# Patient Record
Sex: FEMALE | Race: BLACK OR AFRICAN AMERICAN | NOT HISPANIC OR LATINO | Employment: OTHER | URBAN - METROPOLITAN AREA
[De-identification: names, ages, dates, MRNs, and addresses within clinical notes are randomized per-mention and may not be internally consistent; named-entity substitution may affect disease eponyms.]

---

## 2021-02-11 ENCOUNTER — TELEPHONE (OUTPATIENT)
Dept: CARDIOLOGY | Facility: CLINIC | Age: 71
End: 2021-02-11

## 2021-03-26 ENCOUNTER — OFFICE VISIT (OUTPATIENT)
Dept: CARDIOLOGY | Facility: CLINIC | Age: 71
End: 2021-03-26
Payer: MEDICARE

## 2021-03-26 VITALS
RESPIRATION RATE: 16 BRPM | DIASTOLIC BLOOD PRESSURE: 72 MMHG | WEIGHT: 239 LBS | BODY MASS INDEX: 33.46 KG/M2 | SYSTOLIC BLOOD PRESSURE: 138 MMHG | HEART RATE: 72 BPM | HEIGHT: 71 IN | OXYGEN SATURATION: 98 %

## 2021-03-26 DIAGNOSIS — I10 ESSENTIAL HYPERTENSION: ICD-10-CM

## 2021-03-26 DIAGNOSIS — E66.01 SEVERE OBESITY: ICD-10-CM

## 2021-03-26 DIAGNOSIS — R00.2 PALPITATIONS: ICD-10-CM

## 2021-03-26 DIAGNOSIS — I48.0 PAROXYSMAL ATRIAL FIBRILLATION: Primary | ICD-10-CM

## 2021-03-26 DIAGNOSIS — R94.39 ABNORMAL STRESS TEST: ICD-10-CM

## 2021-03-26 DIAGNOSIS — I25.10 CAD IN NATIVE ARTERY: ICD-10-CM

## 2021-03-26 DIAGNOSIS — E78.2 MIXED HYPERLIPIDEMIA: ICD-10-CM

## 2021-03-26 DIAGNOSIS — E16.2 HYPOGLYCEMIA: ICD-10-CM

## 2021-03-26 PROCEDURE — 93000 ELECTROCARDIOGRAM COMPLETE: CPT | Mod: S$GLB,,, | Performed by: INTERNAL MEDICINE

## 2021-03-26 PROCEDURE — 99214 OFFICE O/P EST MOD 30 MIN: CPT | Mod: S$GLB,,, | Performed by: INTERNAL MEDICINE

## 2021-03-26 PROCEDURE — 99214 PR OFFICE/OUTPT VISIT, EST, LEVL IV, 30-39 MIN: ICD-10-PCS | Mod: S$GLB,,, | Performed by: INTERNAL MEDICINE

## 2021-03-26 PROCEDURE — 93000 EKG 12-LEAD: ICD-10-PCS | Mod: S$GLB,,, | Performed by: INTERNAL MEDICINE

## 2021-03-26 RX ORDER — CARVEDILOL PHOSPHATE 20 MG/1
20 CAPSULE, EXTENDED RELEASE ORAL DAILY
COMMUNITY
Start: 2021-03-11 | End: 2021-06-04 | Stop reason: SDUPTHER

## 2021-03-26 RX ORDER — ATORVASTATIN CALCIUM 10 MG/1
10 TABLET, FILM COATED ORAL DAILY
COMMUNITY
Start: 2021-01-18 | End: 2023-12-06

## 2021-03-26 RX ORDER — VALACYCLOVIR HYDROCHLORIDE 1 G/1
1000 TABLET, FILM COATED ORAL EVERY 12 HOURS
COMMUNITY

## 2021-03-26 RX ORDER — LANOLIN ALCOHOL/MO/W.PET/CERES
400 CREAM (GRAM) TOPICAL DAILY
COMMUNITY
End: 2021-05-25

## 2021-03-26 RX ORDER — ASPIRIN 81 MG/1
81 TABLET ORAL DAILY
COMMUNITY

## 2021-06-04 RX ORDER — CARVEDILOL PHOSPHATE 20 MG/1
20 CAPSULE, EXTENDED RELEASE ORAL DAILY
Qty: 90 CAPSULE | Refills: 3 | Status: SHIPPED | OUTPATIENT
Start: 2021-06-04 | End: 2021-06-07 | Stop reason: SDUPTHER

## 2021-06-04 NOTE — TELEPHONE ENCOUNTER
----- Message from Kori Navarrete sent at 6/4/2021  2:11 PM CDT -----  Regarding: medication  Noa Andrade calling regarding Refills  (message) for #COREG CR 20 mg 24 hr capsule send refill to [facility] on [address] in [place]

## 2021-06-07 RX ORDER — CARVEDILOL PHOSPHATE 20 MG/1
20 CAPSULE, EXTENDED RELEASE ORAL DAILY
Qty: 90 CAPSULE | Refills: 3 | Status: SHIPPED | OUTPATIENT
Start: 2021-06-07 | End: 2022-01-03 | Stop reason: SDUPTHER

## 2021-06-07 NOTE — TELEPHONE ENCOUNTER
----- Message from Nelly Duran sent at 6/7/2021  7:39 AM CDT -----  Patient said Pharmacy has been calling for four days for a refill on her Coreg CR 20mg.  Please call in and then call patient with the status.   [phone]

## 2021-09-10 ENCOUNTER — OFFICE VISIT (OUTPATIENT)
Dept: CARDIOLOGY | Facility: CLINIC | Age: 71
End: 2021-09-10
Payer: MEDICARE

## 2021-09-10 VITALS
BODY MASS INDEX: 33.46 KG/M2 | HEIGHT: 71 IN | WEIGHT: 239 LBS | HEART RATE: 73 BPM | RESPIRATION RATE: 16 BRPM | DIASTOLIC BLOOD PRESSURE: 90 MMHG | SYSTOLIC BLOOD PRESSURE: 142 MMHG | OXYGEN SATURATION: 98 %

## 2021-09-10 DIAGNOSIS — E16.2 HYPOGLYCEMIA: ICD-10-CM

## 2021-09-10 DIAGNOSIS — R00.2 PALPITATIONS: ICD-10-CM

## 2021-09-10 DIAGNOSIS — E78.2 MIXED HYPERLIPIDEMIA: ICD-10-CM

## 2021-09-10 DIAGNOSIS — I25.10 CAD IN NATIVE ARTERY: ICD-10-CM

## 2021-09-10 DIAGNOSIS — R94.39 ABNORMAL STRESS TEST: Primary | ICD-10-CM

## 2021-09-10 DIAGNOSIS — I10 ESSENTIAL HYPERTENSION: ICD-10-CM

## 2021-09-10 PROCEDURE — 93000 EKG 12-LEAD: ICD-10-PCS | Mod: S$GLB,,, | Performed by: INTERNAL MEDICINE

## 2021-09-10 PROCEDURE — 99214 OFFICE O/P EST MOD 30 MIN: CPT | Mod: S$GLB,,, | Performed by: INTERNAL MEDICINE

## 2021-09-10 PROCEDURE — 93000 ELECTROCARDIOGRAM COMPLETE: CPT | Mod: S$GLB,,, | Performed by: INTERNAL MEDICINE

## 2021-09-10 PROCEDURE — 99214 PR OFFICE/OUTPT VISIT, EST, LEVL IV, 30-39 MIN: ICD-10-PCS | Mod: S$GLB,,, | Performed by: INTERNAL MEDICINE

## 2021-09-10 RX ORDER — GABAPENTIN 300 MG/1
CAPSULE ORAL
COMMUNITY
Start: 2021-07-21

## 2021-09-30 ENCOUNTER — TELEPHONE (OUTPATIENT)
Dept: CARDIOLOGY | Facility: CLINIC | Age: 71
End: 2021-09-30

## 2022-01-03 RX ORDER — CARVEDILOL PHOSPHATE 20 MG/1
20 CAPSULE, EXTENDED RELEASE ORAL DAILY
Qty: 90 CAPSULE | Refills: 3 | Status: SHIPPED | OUTPATIENT
Start: 2022-01-03 | End: 2022-07-20

## 2022-01-03 NOTE — TELEPHONE ENCOUNTER
----- Message from Navneet Lee sent at 1/3/2022  1:17 PM CST -----  Type: Needs Medical Advice    Who Called:pt  Best Call Back Number:[phone]    Additional Information: Requesting callback regarding needing to see if fax came over from ins for Rx COREG CR 20 mg 24 hr capsule please call back pt. [facility]     Please Advise-Thank you

## 2022-01-05 ENCOUNTER — TELEPHONE (OUTPATIENT)
Dept: CARDIOLOGY | Facility: CLINIC | Age: 72
End: 2022-01-05
Payer: MEDICARE

## 2022-01-05 NOTE — TELEPHONE ENCOUNTER
----- Message from Radha Gutierrez sent at 1/5/2022  1:58 PM CST -----  Contact: pt  Type: Needs Medical Advice    Who Called:pt  Best Call Back Number:[phone]  Additional  Information: pt needs call back regarding a form that was sent from her insurance company in December. Wants to know if it has been filled out and sent back so insurance will cover the cost of RX COREG CR 20 mg 24 hr capsule  Please Advise- Thank you

## 2022-03-18 ENCOUNTER — OFFICE VISIT (OUTPATIENT)
Dept: CARDIOLOGY | Facility: CLINIC | Age: 72
End: 2022-03-18
Payer: MEDICARE

## 2022-03-18 VITALS
RESPIRATION RATE: 16 BRPM | BODY MASS INDEX: 33.46 KG/M2 | WEIGHT: 239 LBS | HEART RATE: 77 BPM | SYSTOLIC BLOOD PRESSURE: 140 MMHG | OXYGEN SATURATION: 98 % | HEIGHT: 71 IN | DIASTOLIC BLOOD PRESSURE: 90 MMHG

## 2022-03-18 DIAGNOSIS — R00.2 PALPITATIONS: ICD-10-CM

## 2022-03-18 DIAGNOSIS — R94.39 ABNORMAL STRESS TEST: ICD-10-CM

## 2022-03-18 DIAGNOSIS — I25.10 CAD IN NATIVE ARTERY: ICD-10-CM

## 2022-03-18 DIAGNOSIS — E78.2 MIXED HYPERLIPIDEMIA: ICD-10-CM

## 2022-03-18 DIAGNOSIS — I10 ESSENTIAL HYPERTENSION: ICD-10-CM

## 2022-03-18 DIAGNOSIS — E66.01 SEVERE OBESITY: ICD-10-CM

## 2022-03-18 DIAGNOSIS — I48.0 PAROXYSMAL ATRIAL FIBRILLATION: Primary | ICD-10-CM

## 2022-03-18 PROCEDURE — 93005 ELECTROCARDIOGRAM TRACING: CPT | Mod: S$GLB,,, | Performed by: INTERNAL MEDICINE

## 2022-03-18 PROCEDURE — 99214 PR OFFICE/OUTPT VISIT, EST, LEVL IV, 30-39 MIN: ICD-10-PCS | Mod: S$GLB,,, | Performed by: INTERNAL MEDICINE

## 2022-03-18 PROCEDURE — 99214 OFFICE O/P EST MOD 30 MIN: CPT | Mod: S$GLB,,, | Performed by: INTERNAL MEDICINE

## 2022-03-18 PROCEDURE — 93005 EKG 12-LEAD: ICD-10-PCS | Mod: S$GLB,,, | Performed by: INTERNAL MEDICINE

## 2022-03-18 NOTE — PROGRESS NOTES
Subjective:    Patient ID:  Noa Andrade is a 71 y.o. female     Chief Complaint   Patient presents with    Hyperlipidemia    Hypertension       HPI:  Ms Noa Andrade is a 71 y.o. female is here for follow-up.  Patient has been doing well.  She has been exercising on a regular basis she does walk and she does some chair exercises.  However when she is walking long distances she does get dyspnea on exertion.  Her breathing otherwise is okay.  Denies any chest pain or tightness or heaviness denies any dizziness or lightheadedness or loss of consciousness falls or head injury.  She has been taking all her medications regularly.  Patient had a sensation of sudden weakness like sensation in both the legs and she was unsteady and actually she did fall to the ground and she went to see her orthopedic surgeon who had done her back injections prior to that.  And she was evaluated and did not find any other causes.  Day probably thought it was due to the recent injection she got the back.  After that she has not had any more episodes.    Review of patient's allergies indicates:   Allergen Reactions    Clopidogrel      Other reaction(s): Itch  Other reaction(s): Itching       Past Medical History:   Diagnosis Date    Hyperlipidemia     Hypertension      History reviewed. No pertinent surgical history.  Social History     Tobacco Use    Smoking status: Never Smoker    Smokeless tobacco: Never Used   Substance Use Topics    Alcohol use: Not Currently    Drug use: Not Currently     Family History   Problem Relation Age of Onset    Ulcers Mother     Prostate cancer Father         Review of Systems:   Constitution: Negative for diaphoresis and fever.   HEENT: Negative for nosebleeds.    Cardiovascular: Negative for chest pain       Intermittent dyspnea on exertion       No leg swelling        No palpitations  Respiratory: Negative for shortness of breath and wheezing.    Hematologic/Lymphatic: Negative for bleeding  problem. Does not bruise/bleed easily.   Skin: Negative for color change and rash.   Musculoskeletal: Negative for falls and myalgias.   Gastrointestinal: Negative for hematemesis and hematochezia.   Genitourinary: Negative for hematuria.   Neurological: Negative for dizziness and light-headedness.   Psychiatric/Behavioral: Negative for altered mental status and memory loss.          Objective:        Vitals:    03/18/22 1046   BP: (!) 140/90   Pulse: 77   Resp: 16       No results found for: WBC, HGB, HCT, PLT, CHOL, TRIG, HDL, LDLDIRECT, ALT, AST, NA, K, CL, CREATININE, BUN, CO2, TSH, PSA, INR, GLUF, HGBA1C, MICROALBUR     ECHOCARDIOGRAM RESULTS  No results found for this or any previous visit.        CURRENT/PREVIOUS VISIT EKG  Results for orders placed or performed in visit on 09/10/21   IN OFFICE EKG 12-LEAD (to [place])    Collection Time: 09/10/21  9:51 AM    Narrative    Test Reason : R94.39,    Vent. Rate : 073 BPM     Atrial Rate : 073 BPM     P-R Int : 138 ms          QRS Dur : 084 ms      QT Int : 404 ms       P-R-T Axes : 064 082 024 degrees     QTc Int : 445 ms    Normal sinus rhythm  Nonspecific ST abnormality  Abnormal ECG  When compared with ECG of 26-MAR-2021 10:32,  Nonspecific T wave abnormality now evident in Inferior leads  Confirmed by Isaiah Renee MD ([id number]) on 9/25/2021 6:29:28 PM    Referred By:             Confirmed By:Isaiah Renee MD     No valid procedures specified.   No results found for this or any previous visit.      Physical Exam:  CONSTITUTIONAL: No fever, no chills  HEENT: Normocephalic, atraumatic,pupils reactive to light                 NECK:  No JVD no carotid bruit  CVS: S1S2+, RRR, faint systolic murmurs,   LUNGS: Clear  ABDOMEN: Soft, NT, BS+  EXTREMITIES: No cyanosis, edema  : No jj catheter  NEURO: AAO X 3  PSY: Normal affect      Medication List with Changes/Refills   Current Medications    AMLODIPINE (NORVASC) 2.5 MG TABLET    TAKE 1 TABLET BY MOUTH EVERY DAY     ASPIRIN (ECOTRIN) 81 MG EC TABLET    Take 81 mg by mouth once daily.    ATORVASTATIN (LIPITOR) 10 MG TABLET    Take 10 mg by mouth once daily.    COREG CR 20 MG 24 HR CAPSULE    Take 1 capsule (20 mg total) by mouth once daily.    GABAPENTIN (NEURONTIN) 300 MG CAPSULE    Take by mouth.    MAGNESIUM OXIDE (MAG-OX) 400 MG (241.3 MG MAGNESIUM) TABLET    TAKE 0.5 TABLET BY MOUTH EVERY OTHER DAY    VALACYCLOVIR (VALTREX) 1000 MG TABLET    Take 1,000 mg by mouth every 12 (twelve) hours.             Assessment:       1. Paroxysmal atrial fibrillation    2. Essential hypertension    3. Mixed hyperlipidemia    4. CAD in native artery    5. Palpitations    6. Abnormal stress test    7. Severe obesity         Plan:   1. Patient has been doing well her blood pressure is 140/90.  She has been taking all her medications at home.  Discussed with patient she needs to check her blood pressure at home.  2. Patient has low back pain and she has seen orthopedic surgeons and they have given injections to the back.  She had an transient episode of weakness in the legs and it has not come back again.  At the present time she is stable.  3. Patient is currently on Coreg 20 mg p.o. q.day continue the same she is on amlodipine 2.5 mg in the morning will increase that to 2.5 mg morning and evening.  4. Patient has history of paroxysmal atrial fibrillation and on her CHADS Vasc score is 1 and continue aspirin at the present time.  She has not had any more paroxysmal atrial fibrillation.  5. Patient has been exercising patient discussed with patient to continue to exercise continue to do the chair exercises and also patient to walk morning and evening twice a day at least and I mildly each time.  And this would really help to improve her shortness of breath.  6. Patient to check her blood pressure when she is exercising because her blood pressure could be going up higher and higher when she is walking and that is probably causing her shortness  of breath.  7. Reviewed EKG independently patient is in normal sinus rhythm with heart rate of 77 beats per minute no acute ST T-wave changes poor R-wave progression in the anterior precordial leads no significant change.  From the previous EKG.  And her QT intervals have been within normal limits.  8. Continue current management I will see her back in the office in 6 months time.        Problem List Items Addressed This Visit        Cardiac/Vascular    Paroxysmal atrial fibrillation - Primary    Relevant Orders    IN OFFICE EKG 12-LEAD (to [facility])    Abnormal stress test    Palpitations    Essential hypertension    Mixed hyperlipidemia       Endocrine    Severe obesity      Other Visit Diagnoses     CAD in native artery              No follow-ups on file.

## 2022-09-16 ENCOUNTER — OFFICE VISIT (OUTPATIENT)
Dept: CARDIOLOGY | Facility: CLINIC | Age: 72
End: 2022-09-16
Payer: MEDICARE

## 2022-09-16 VITALS
RESPIRATION RATE: 16 BRPM | SYSTOLIC BLOOD PRESSURE: 128 MMHG | DIASTOLIC BLOOD PRESSURE: 72 MMHG | HEART RATE: 76 BPM | BODY MASS INDEX: 33.46 KG/M2 | OXYGEN SATURATION: 98 % | WEIGHT: 239 LBS | HEIGHT: 71 IN

## 2022-09-16 DIAGNOSIS — R00.2 PALPITATIONS: ICD-10-CM

## 2022-09-16 DIAGNOSIS — E16.2 HYPOGLYCEMIA: ICD-10-CM

## 2022-09-16 DIAGNOSIS — E66.01 SEVERE OBESITY: ICD-10-CM

## 2022-09-16 DIAGNOSIS — R94.39 ABNORMAL STRESS TEST: ICD-10-CM

## 2022-09-16 DIAGNOSIS — E78.2 MIXED HYPERLIPIDEMIA: ICD-10-CM

## 2022-09-16 DIAGNOSIS — I48.0 PAROXYSMAL ATRIAL FIBRILLATION: Primary | ICD-10-CM

## 2022-09-16 DIAGNOSIS — I10 ESSENTIAL HYPERTENSION: ICD-10-CM

## 2022-09-16 DIAGNOSIS — I25.10 CAD IN NATIVE ARTERY: ICD-10-CM

## 2022-09-16 PROCEDURE — 99214 OFFICE O/P EST MOD 30 MIN: CPT | Mod: S$GLB,,, | Performed by: INTERNAL MEDICINE

## 2022-09-16 PROCEDURE — 93000 EKG 12-LEAD: ICD-10-PCS | Mod: S$GLB,,, | Performed by: INTERNAL MEDICINE

## 2022-09-16 PROCEDURE — 99214 PR OFFICE/OUTPT VISIT, EST, LEVL IV, 30-39 MIN: ICD-10-PCS | Mod: S$GLB,,, | Performed by: INTERNAL MEDICINE

## 2022-09-16 PROCEDURE — 93000 ELECTROCARDIOGRAM COMPLETE: CPT | Mod: S$GLB,,, | Performed by: INTERNAL MEDICINE

## 2022-09-16 RX ORDER — NAPROXEN 375 MG/1
375 TABLET ORAL 2 TIMES DAILY WITH MEALS
COMMUNITY

## 2022-09-16 RX ORDER — FLUTICASONE PROPIONATE 50 MCG
1 SPRAY, SUSPENSION (ML) NASAL 2 TIMES DAILY
COMMUNITY
Start: 2022-03-25

## 2022-09-16 RX ORDER — CLOBETASOL PROPIONATE 0.5 MG/G
CREAM TOPICAL
COMMUNITY
Start: 2022-08-31

## 2022-09-16 NOTE — PROGRESS NOTES
Subjective:    Patient ID:  Noa Andrade is a 72 y.o. female     Chief Complaint   Patient presents with    Atrial Fibrillation    Hypertension       HPI:  Ms Noa Andrade is a 72 y.o. female is here for follow-up.    Patient has been doing well no specific complaints at the present time.  Her breathing has been good denies any shortness of breath or difficulty in breathing denies any chest pain or tightness or heaviness denies any dizziness or lightheadedness denies any loss of consciousness falls or head injury.    She has been taking all her medications regularly.        Review of patient's allergies indicates:   Allergen Reactions    Clopidogrel      Other reaction(s): Itch  Other reaction(s): Itching       Past Medical History:   Diagnosis Date    Hyperlipidemia     Hypertension      History reviewed. No pertinent surgical history.  Social History     Tobacco Use    Smoking status: Never    Smokeless tobacco: Never   Substance Use Topics    Alcohol use: Not Currently    Drug use: Not Currently     Family History   Problem Relation Age of Onset    Ulcers Mother     Prostate cancer Father         Review of Systems:   Constitution: Negative for diaphoresis and fever.   HEENT: Negative for nosebleeds.    Cardiovascular: Negative for chest pain       No dyspnea on exertion       No leg swelling        No palpitations  Respiratory: Negative for shortness of breath and wheezing.    Hematologic/Lymphatic: Negative for bleeding problem. Does not bruise/bleed easily.   Skin: Negative for color change and rash.   Musculoskeletal: Negative for falls and myalgias.   Gastrointestinal: Negative for hematemesis and hematochezia.   Genitourinary: Negative for hematuria.   Neurological: Negative for dizziness and light-headedness.   Psychiatric/Behavioral: Negative for altered mental status and memory loss.          Objective:        Vitals:    09/16/22 1329   BP: 128/72   Pulse: 76   Resp: 16       No results found for: WBC,  HGB, HCT, PLT, CHOL, TRIG, HDL, LDLDIRECT, ALT, AST, NA, K, CL, CREATININE, BUN, CO2, TSH, PSA, INR, GLUF, HGBA1C, MICROALBUR     ECHOCARDIOGRAM RESULTS  No results found for this or any previous visit.        CURRENT/PREVIOUS VISIT EKG  Results for orders placed or performed in visit on 03/18/22   IN OFFICE EKG 12-LEAD (to [place])    Collection Time: 03/18/22 10:45 AM    Narrative    Test Reason : I48.0,    Vent. Rate : 077 BPM     Atrial Rate : 077 BPM     P-R Int : 136 ms          QRS Dur : 088 ms      QT Int : 392 ms       P-R-T Axes : 075 078 054 degrees     QTc Int : 443 ms    Normal sinus rhythm  Possible septal infarct age undetermined   Abnormal ECG  Confirmed by Destini TRAN, Felice CASANOVA ([id number]) on 4/17/2022 4:43:54 PM    Referred By:             Confirmed By:Felice Georges MD     No valid procedures specified.   No results found for this or any previous visit.      Physical Exam:  CONSTITUTIONAL: No fever, no chills  HEENT: Normocephalic, atraumatic,pupils reactive to light                 NECK:  No JVD no carotid bruit  CVS: S1S2+, RRR, systolic murmurs,   LUNGS: Clear  ABDOMEN: Soft, NT, BS+  EXTREMITIES: No cyanosis, edema  : No jj catheter  NEURO: AAO X 3  PSY: Normal affect      Medication List with Changes/Refills   Current Medications    AMLODIPINE (NORVASC) 2.5 MG TABLET    TAKE 1 TABLET BY MOUTH EVERY DAY    ASPIRIN (ECOTRIN) 81 MG EC TABLET    Take 81 mg by mouth once daily.    ATORVASTATIN (LIPITOR) 10 MG TABLET    Take 10 mg by mouth once daily.    CLOBETASOL (TEMOVATE) 0.05 % CREAM    PLEASE SEE ATTACHED FOR DETAILED DIRECTIONS    COREG CR 20 MG 24 HR CAPSULE    TAKE 1 CAPSULE BY MOUTH EVERY DAY    FLUTICASONE PROPIONATE (FLONASE) 50 MCG/ACTUATION NASAL SPRAY    1 spray by Each Nostril route 2 (two) times daily.    GABAPENTIN (NEURONTIN) 300 MG CAPSULE    Take by mouth.    MAGNESIUM OXIDE (MAG-OX) 400 MG (241.3 MG MAGNESIUM) TABLET    TAKE 0.5 TABLET BY MOUTH EVERY OTHER DAY     NAPROXEN (NAPROSYN) 375 MG TABLET    Take 375 mg by mouth 2 (two) times daily with meals.    VALACYCLOVIR (VALTREX) 1000 MG TABLET    Take 1,000 mg by mouth every 12 (twelve) hours.             Assessment:       1. Paroxysmal atrial fibrillation    2. Essential hypertension    3. Mixed hyperlipidemia    4. CAD in native artery    5. Palpitations    6. Abnormal stress test    7. Severe obesity    8. Hypoglycemia         Plan:   1. Essential hypertension   Patient's blood pressure is stable at 120 8/72 and she is currently on Coreg CR 20 mg p.o. q.day continue the same and she is also on amlodipine 2.5 mg p.o. q.day.  Continue the same.    2. Paroxysmal atrial fibrillation  Patient is currently on aspirin 81 mg p.o. q.day continue the same she is also on magnesium oxide 200 mg p.o. continue the same she is also on Coreg CR 20 mg p.o. q.day.  Patient is currently in sinus rhythm.  And by chads Vasc score she is 1.  3. Mixed hyperlipidemia    Patient is currently on atorvastatin 10 mg p.o. q.day continue the same and a primary physician is checking her cholesterol and liver function test.  4. Palpitations   Patient has not had palpitations.  She is currently on Coreg CR 20 mg p.o. q.day.    5. EKG   Reviewed EKG independently patient is in normal sinus rhythm with heart rate of 76 beats per minute borderline nonspecific ST T-wave changes.  No acute change from the previous EKG.    6. Continue current management and I will see her back in the office in 6 months time.          Problem List Items Addressed This Visit          Cardiac/Vascular    Paroxysmal atrial fibrillation - Primary    Abnormal stress test    Palpitations    Essential hypertension    Mixed hyperlipidemia       Endocrine    Hypoglycemia    Severe obesity     Other Visit Diagnoses       CAD in native artery                No follow-ups on file.

## 2023-01-20 NOTE — TELEPHONE ENCOUNTER
----- Message from Marie Terrazas sent at 1/20/2023  2:37 PM CST -----  Contact: pt  Type: Needs Medical Advice         Who Called: pt  Best Call Back Number: [phone]  Additional Information: Requesting a call back regarding  pt is having issues getting her COREG CR 20 mg 24 hr capsule filled due to pharm's are out of stock and pt has been out for 4 days now. Pt asking if something else can be called in          [id number] - [place], [place] - [address]  [address]  [facility] [address]  Phone: [phone] Fax: [phone]          Please Advise- Thank you     .

## 2023-01-23 RX ORDER — CARVEDILOL PHOSPHATE 20 MG/1
20 CAPSULE, EXTENDED RELEASE ORAL DAILY
Qty: 90 CAPSULE | Refills: 3 | Status: SHIPPED | OUTPATIENT
Start: 2023-01-23 | End: 2023-01-23 | Stop reason: SDUPTHER

## 2023-01-23 RX ORDER — CARVEDILOL PHOSPHATE 20 MG/1
20 CAPSULE, EXTENDED RELEASE ORAL DAILY
Qty: 90 CAPSULE | Refills: 3 | Status: SHIPPED | OUTPATIENT
Start: 2023-01-23 | End: 2023-12-06 | Stop reason: CLARIF

## 2023-01-23 NOTE — TELEPHONE ENCOUNTER
----- Message from Tong Carmichael sent at 1/23/2023 10:45 AM CST -----  Type: Needs Medical Advice  Who Called:  Pt  Symptoms (please be specific):  Missing RX  How long has patient had these symptoms:  a week  Pharmacy name and phone #:      [id number] - [place], [place] - [address]  [address]  [facility] [place] [address]  Phone: [phone] Fax: [phone]      Best Call Back Number: [phone]     Additional Information: Sts she has been without her RX COREG CR 20 mg 24 hr capsule for over a week and no pharm has it or can get it in.  Needs to know what to do asap.  Please advise -- Thank you

## 2023-04-24 ENCOUNTER — TELEPHONE (OUTPATIENT)
Dept: CARDIOLOGY | Facility: CLINIC | Age: 73
End: 2023-04-24
Payer: MEDICARE

## 2023-04-24 NOTE — TELEPHONE ENCOUNTER
----- Message from Nelly Bernal sent at 4/24/2023  2:33 PM CDT -----  Contact: Patient  Type:  Needs Medical Advice    Who Called: Patient     Symptoms (please be specific): 6 month follow up/ last seen 09/2022     Pharmacy name and phone #:    [id number] - [place], [place] - [address] [facility] [address] [address]  [address]  [place] [address]  Phone: [phone] Fax: [phone]      Would the patient rather a call back or a response via [facility]? Call    Best Call Back Number: [phone] (home)     Additional Information: Please call to advise about appointment openings

## 2023-05-03 ENCOUNTER — OFFICE VISIT (OUTPATIENT)
Dept: CARDIOLOGY | Facility: CLINIC | Age: 73
End: 2023-05-03
Payer: MEDICARE

## 2023-05-03 VITALS
HEART RATE: 82 BPM | SYSTOLIC BLOOD PRESSURE: 128 MMHG | WEIGHT: 239 LBS | BODY MASS INDEX: 33.46 KG/M2 | DIASTOLIC BLOOD PRESSURE: 80 MMHG | RESPIRATION RATE: 16 BRPM | HEIGHT: 71 IN | OXYGEN SATURATION: 98 %

## 2023-05-03 DIAGNOSIS — E16.2 HYPOGLYCEMIA: ICD-10-CM

## 2023-05-03 DIAGNOSIS — R94.39 ABNORMAL STRESS TEST: ICD-10-CM

## 2023-05-03 DIAGNOSIS — I25.10 CAD IN NATIVE ARTERY: ICD-10-CM

## 2023-05-03 DIAGNOSIS — R00.2 PALPITATIONS: ICD-10-CM

## 2023-05-03 DIAGNOSIS — E66.01 SEVERE OBESITY: ICD-10-CM

## 2023-05-03 DIAGNOSIS — I10 ESSENTIAL HYPERTENSION: ICD-10-CM

## 2023-05-03 DIAGNOSIS — E78.2 MIXED HYPERLIPIDEMIA: ICD-10-CM

## 2023-05-03 DIAGNOSIS — I48.0 PAROXYSMAL ATRIAL FIBRILLATION: Primary | ICD-10-CM

## 2023-05-03 PROCEDURE — 93010 EKG 12-LEAD: ICD-10-PCS | Mod: S$PBB,,, | Performed by: INTERNAL MEDICINE

## 2023-05-03 PROCEDURE — 93005 ELECTROCARDIOGRAM TRACING: CPT | Mod: PBBFAC,PN | Performed by: INTERNAL MEDICINE

## 2023-05-03 PROCEDURE — 99214 OFFICE O/P EST MOD 30 MIN: CPT | Mod: S$PBB,,, | Performed by: INTERNAL MEDICINE

## 2023-05-03 PROCEDURE — 99214 PR OFFICE/OUTPT VISIT, EST, LEVL IV, 30-39 MIN: ICD-10-PCS | Mod: S$PBB,,, | Performed by: INTERNAL MEDICINE

## 2023-05-03 PROCEDURE — 99999 PR PBB SHADOW E&M-EST. PATIENT-LVL III: CPT | Mod: PBBFAC,,, | Performed by: INTERNAL MEDICINE

## 2023-05-03 PROCEDURE — 99999 PR PBB SHADOW E&M-EST. PATIENT-LVL III: ICD-10-PCS | Mod: PBBFAC,,, | Performed by: INTERNAL MEDICINE

## 2023-05-03 PROCEDURE — 99213 OFFICE O/P EST LOW 20 MIN: CPT | Mod: PBBFAC,PN | Performed by: INTERNAL MEDICINE

## 2023-05-03 PROCEDURE — 93010 ELECTROCARDIOGRAM REPORT: CPT | Mod: S$PBB,,, | Performed by: INTERNAL MEDICINE

## 2023-05-03 RX ORDER — ATORVASTATIN CALCIUM 40 MG/1
20 TABLET, FILM COATED ORAL
COMMUNITY
Start: 2022-11-14

## 2023-05-03 RX ORDER — MELOXICAM 7.5 MG/1
7.5 TABLET ORAL
COMMUNITY
Start: 2023-04-26

## 2023-05-03 NOTE — PROGRESS NOTES
Subjective:    Patient ID:  Noa Andrade is a 72 y.o. female     Chief Complaint   Patient presents with    Atrial Fibrillation    Hyperlipidemia    Hypertension       HPI:  Ms Noa Andrade is a 72 y.o. female is here for follow-up.    Patient has been doing well except that she has significant hip pain she has seen her orthopedic doctor and for the past 6 months she is been hurting and she needs hip surgery.  And is here for the same for clearance.    Patient has been having significant pain on walking and does feel some shortness of breath on walking.  At times feels tired and fatigued some of it is related to the hip pain.  Denies any chest pain or tightness heaviness denies any dizziness or lightheadedness or loss of consciousness falls or head injury.        Review of patient's allergies indicates:   Allergen Reactions    Clopidogrel Itching     Other reaction(s): Itch  Other reaction(s): Itching  Other reaction(s): Itch  Other reaction(s): Itching       Past Medical History:   Diagnosis Date    Hyperlipidemia     Hypertension      History reviewed. No pertinent surgical history.  Social History     Tobacco Use    Smoking status: Never    Smokeless tobacco: Never   Substance Use Topics    Alcohol use: Not Currently    Drug use: Not Currently     Family History   Problem Relation Age of Onset    Ulcers Mother     Prostate cancer Father         Review of Systems:   Constitution: Negative for diaphoresis and fever.   HEENT: Negative for nosebleeds.    Cardiovascular: Negative for chest pain       No dyspnea on exertion       No leg swelling        No palpitations  Respiratory: Negative for shortness of breath and wheezing.    Hematologic/Lymphatic: Negative for bleeding problem. Does not bruise/bleed easily.   Skin: Negative for color change and rash.   Musculoskeletal: Negative for falls and myalgias.  Severe right hip pain.    Gastrointestinal: Negative for hematemesis and hematochezia.   Genitourinary: Negative  for hematuria.   Neurological: Negative for dizziness and light-headedness.   Psychiatric/Behavioral: Negative for altered mental status and memory loss.          Objective:        Vitals:    05/03/23 1610   BP: 128/80   Pulse: 82   Resp: 16       No results found for: WBC, HGB, HCT, PLT, CHOL, TRIG, HDL, LDLDIRECT, ALT, AST, NA, K, CL, CREATININE, BUN, CO2, TSH, PSA, INR, GLUF, HGBA1C, MICROALBUR     ECHOCARDIOGRAM RESULTS  No results found for this or any previous visit.        CURRENT/PREVIOUS VISIT EKG  Results for orders placed or performed in visit on 09/16/22   IN OFFICE EKG 12-LEAD (to [place])    Collection Time: 09/16/22  1:30 PM    Narrative    Test Reason : I48.0,    Vent. Rate : 076 BPM     Atrial Rate : 076 BPM     P-R Int : 134 ms          QRS Dur : 086 ms      QT Int : 378 ms       P-R-T Axes : 074 074 048 degrees     QTc Int : 425 ms    Normal sinus rhythm  Nonspecific ST abnormality  Abnormal ECG  When compared with ECG of 18-MAR-2022 10:45,  No significant change was found  Confirmed by Isaiah Renee MD ([id number]) on 9/27/2022 6:51:54 PM    Referred By:             Confirmed By:Isaiah Renee MD     No valid procedures specified.   No results found for this or any previous visit.      Physical Exam:  CONSTITUTIONAL: No fever, no chills  HEENT: Normocephalic, atraumatic,pupils reactive to light                 NECK:  No JVD no carotid bruit  CVS: S1S2+, RRR, systolic murmurs,   LUNGS: Clear  ABDOMEN: Soft, NT, BS+  EXTREMITIES: No cyanosis, edema  : No jj catheter  NEURO: AAO X 3  PSY: Normal affect      Medication List with Changes/Refills   Current Medications    AMLODIPINE (NORVASC) 2.5 MG TABLET    TAKE 1 TABLET BY MOUTH EVERY DAY    ASPIRIN (ECOTRIN) 81 MG EC TABLET    Take 81 mg by mouth once daily.    ATORVASTATIN (LIPITOR) 10 MG TABLET    Take 10 mg by mouth once daily.    ATORVASTATIN (LIPITOR) 40 MG TABLET    Take 20 mg by mouth.    CARVEDILOL PHOSPHATE 20 MG ORAL CM24 (COREG CR) 20 MG 24  HR CAPSULE    Take 1 capsule (20 mg total) by mouth once daily.    CLOBETASOL (TEMOVATE) 0.05 % CREAM    PLEASE SEE ATTACHED FOR DETAILED DIRECTIONS    FLUTICASONE PROPIONATE (FLONASE) 50 MCG/ACTUATION NASAL SPRAY    1 spray by Each Nostril route 2 (two) times daily.    GABAPENTIN (NEURONTIN) 300 MG CAPSULE    Take by mouth.    MAGNESIUM OXIDE (MAG-OX) 400 MG (241.3 MG MAGNESIUM) TABLET    TAKE 0.5 TABLET BY MOUTH EVERY OTHER DAY    MELOXICAM (MOBIC) 7.5 MG TABLET    7.5 mg.    NAPROXEN (NAPROSYN) 375 MG TABLET    Take 375 mg by mouth 2 (two) times daily with meals.    VALACYCLOVIR (VALTREX) 1000 MG TABLET    Take 1,000 mg by mouth every 12 (twelve) hours.             Assessment:       1. Paroxysmal atrial fibrillation    2. Essential hypertension    3. Mixed hyperlipidemia    4. CAD in native artery    5. Palpitations    6. Severe obesity    7. Hypoglycemia    8. Abnormal stress test         Plan:   1. Paroxysmal atrial fibrillation   Patient is currently in sinus rhythm.  And she is on aspirin 81 mg p.o. daily.  In the past she has had 1 transient episode.   2. Coronary artery disease   Patient had mild nonobstructive CAD.  And will get a cath report from [facility].  Patient has no angina no exertional chest pain.    Will do a 2D echocardiogram for LV function ejection fraction wall motion abnormality  3. Essential hypertension  Patient's blood pressure is stable is 128/80 and she is on carvedilol phosphate 20 mg p.o. daily continue the same and she is on amlodipine 2.5 mg p.o. daily continue the same.    4. Mixed hyperlipidemia   Patient is currently on atorvastatin 20 mg p.o. daily continue the same.  And a primary physician is checking her cholesterol and liver function test.    5. EKG   Reviewed EKG independently patient is in normal sinus rhythm with a heart rate of 82 beats per minute nonspecific ST T-wave changes.  And normal intervals no significant change from 2 years ago.  6. Will see  her back in the office after the testing is completed.  We will schedule her for Lexiscan Cardiolite study to evaluate for ischemia.            Problem List Items Addressed This Visit          Cardiac/Vascular    Paroxysmal atrial fibrillation - Primary    Abnormal stress test    Palpitations    Essential hypertension    Mixed hyperlipidemia       Endocrine    Hypoglycemia    Severe obesity     Other Visit Diagnoses       CAD in native artery                No follow-ups on file.

## 2023-05-15 ENCOUNTER — TELEPHONE (OUTPATIENT)
Dept: CARDIOLOGY | Facility: CLINIC | Age: 73
End: 2023-05-15
Payer: MEDICARE

## 2023-05-15 NOTE — TELEPHONE ENCOUNTER
Spoke to the patient. She was asking for the times for stress test. I told her they will call the day before

## 2023-05-15 NOTE — TELEPHONE ENCOUNTER
----- Message from Mary Armando, Patient Care Assistant sent at 5/15/2023  4:18 PM CDT -----  Contact: self  Pt is calling to speak with a nurse in regards to her appts in June [phone]  thanks

## 2023-06-16 ENCOUNTER — TELEPHONE (OUTPATIENT)
Dept: CARDIOLOGY | Facility: HOSPITAL | Age: 73
End: 2023-06-16

## 2023-06-16 NOTE — TELEPHONE ENCOUNTER
Patient advised, test will be at [facility] ([address]   Will need to register on the first floor at the main entrance.   Patient advised that arrival time is 6:40am.  Patient advised that she may be here about 3.5-4 hours, and may want to bring something to occupy their time, as there will be periods of waiting.    Patient advised, may take her medications prior to testing if you need to.  Advised if she needs to eat to take her medications, please keep it light, like toast and juice.    Patient advised to avoid all caffeine 12 hours prior to testing.  This includes decaf tea and coffee.    Will provide peanut butter crackers for a snack after stress test.  If patient would prefer something else, please bring a snack from home.    Wear comfortable clothing.   No lotions, oils, or powders to the upper chest area. May wear deodorant.    No metal jewelry, buttons, or zippers to the upper body.  Patient verbalizes understanding of instructions.

## 2023-06-19 ENCOUNTER — HOSPITAL ENCOUNTER (OUTPATIENT)
Dept: CARDIOLOGY | Facility: HOSPITAL | Age: 73
Discharge: HOME | End: 2023-06-19
Attending: INTERNAL MEDICINE
Payer: MEDICARE

## 2023-06-19 ENCOUNTER — HOSPITAL ENCOUNTER (OUTPATIENT)
Dept: RADIOLOGY | Facility: HOSPITAL | Age: 73
Discharge: HOME | End: 2023-06-19
Attending: INTERNAL MEDICINE
Payer: MEDICARE

## 2023-06-19 VITALS — HEIGHT: 71 IN | BODY MASS INDEX: 33.46 KG/M2 | WEIGHT: 239 LBS

## 2023-06-19 DIAGNOSIS — I25.10 CAD IN NATIVE ARTERY: ICD-10-CM

## 2023-06-19 DIAGNOSIS — E16.2 HYPOGLYCEMIA: ICD-10-CM

## 2023-06-19 DIAGNOSIS — R00.2 PALPITATIONS: ICD-10-CM

## 2023-06-19 DIAGNOSIS — I48.0 PAROXYSMAL ATRIAL FIBRILLATION: ICD-10-CM

## 2023-06-19 DIAGNOSIS — E66.01 SEVERE OBESITY: ICD-10-CM

## 2023-06-19 DIAGNOSIS — E78.2 MIXED HYPERLIPIDEMIA: ICD-10-CM

## 2023-06-19 DIAGNOSIS — R94.39 ABNORMAL STRESS TEST: ICD-10-CM

## 2023-06-19 DIAGNOSIS — I10 ESSENTIAL HYPERTENSION: ICD-10-CM

## 2023-06-19 LAB
AORTIC ROOT ANNULUS: 3.2 CM
AORTIC VALVE CUSP SEPERATION: 1.8 CM
AV INDEX (PROSTH): 0.87
AV MEAN GRADIENT: 3 MMHG
AV PEAK GRADIENT: 5 MMHG
AV VALVE AREA: 3.01 CM2
AV VELOCITY RATIO: 0.79
BSA FOR ECHO PROCEDURE: 2.33 M2
CV ECHO LV RWT: 0.59 CM
DOP CALC AO PEAK VEL: 1.12 M/S
DOP CALC AO VTI: 22.1 CM
DOP CALC LVOT AREA: 3.5 CM2
DOP CALC LVOT DIAMETER: 2.1 CM
DOP CALC LVOT PEAK VEL: 0.88 M/S
DOP CALC LVOT STROKE VOLUME: 66.47 CM3
DOP CALCLVOT PEAK VEL VTI: 19.2 CM
E WAVE DECELERATION TIME: 211 MSEC
E/A RATIO: 0.74
E/E' RATIO: 10 M/S
ECHO LV POSTERIOR WALL: 1.25 CM (ref 0.6–1.1)
EJECTION FRACTION: 60 %
FRACTIONAL SHORTENING: 32 % (ref 28–44)
INTERVENTRICULAR SEPTUM: 1.47 CM (ref 0.6–1.1)
IVRT: 121 MSEC
LEFT ATRIUM SIZE: 3.4 CM
LEFT INTERNAL DIMENSION IN SYSTOLE: 2.9 CM (ref 2.1–4)
LEFT VENTRICLE DIASTOLIC VOLUME INDEX: 35.59 ML/M2
LEFT VENTRICLE DIASTOLIC VOLUME: 80.8 ML
LEFT VENTRICLE MASS INDEX: 96 G/M2
LEFT VENTRICLE SYSTOLIC VOLUME INDEX: 14.2 ML/M2
LEFT VENTRICLE SYSTOLIC VOLUME: 32.2 ML
LEFT VENTRICULAR INTERNAL DIMENSION IN DIASTOLE: 4.25 CM (ref 3.5–6)
LEFT VENTRICULAR MASS: 218.45 G
LV LATERAL E/E' RATIO: 10 M/S
LV SEPTAL E/E' RATIO: 10 M/S
LVOT MG: 1 MMHG
LVOT MV: 0.54 CM/S
MV PEAK A VEL: 0.68 M/S
MV PEAK E VEL: 0.5 M/S
MV STENOSIS PRESSURE HALF TIME: 64 MS
MV VALVE AREA P 1/2 METHOD: 3.44 CM2
PISA TR MAX VEL: 2.12 M/S
RIGHT VENTRICULAR END-DIASTOLIC DIMENSION: 2.55 CM
TDI LATERAL: 0.05 M/S
TDI SEPTAL: 0.05 M/S
TDI: 0.05 M/S
TR MAX PG: 18 MMHG

## 2023-06-19 PROCEDURE — 93306 TTE W/DOPPLER COMPLETE: CPT | Mod: 26,,, | Performed by: INTERNAL MEDICINE

## 2023-06-19 PROCEDURE — 78452 NUCLEAR STRESS - CARDIOLOGY INTERPRETED (CUPID ONLY): ICD-10-PCS | Mod: 26,,, | Performed by: INTERNAL MEDICINE

## 2023-06-19 PROCEDURE — 93016 NUCLEAR STRESS - CARDIOLOGY INTERPRETED (CUPID ONLY): ICD-10-PCS | Mod: ,,, | Performed by: NURSE PRACTITIONER

## 2023-06-19 PROCEDURE — 78452 HT MUSCLE IMAGE SPECT MULT: CPT

## 2023-06-19 PROCEDURE — 78452 HT MUSCLE IMAGE SPECT MULT: CPT | Mod: 26,,, | Performed by: INTERNAL MEDICINE

## 2023-06-19 PROCEDURE — 93018 CV STRESS TEST I&R ONLY: CPT | Mod: ,,, | Performed by: INTERNAL MEDICINE

## 2023-06-19 PROCEDURE — A9502 TC99M TETROFOSMIN: HCPCS

## 2023-06-19 PROCEDURE — 93016 CV STRESS TEST SUPVJ ONLY: CPT | Mod: ,,, | Performed by: NURSE PRACTITIONER

## 2023-06-19 PROCEDURE — 93018 NUCLEAR STRESS - CARDIOLOGY INTERPRETED (CUPID ONLY): ICD-10-PCS | Mod: ,,, | Performed by: INTERNAL MEDICINE

## 2023-06-19 PROCEDURE — 93306 ECHO (CUPID ONLY): ICD-10-PCS | Mod: 26,,, | Performed by: INTERNAL MEDICINE

## 2023-06-19 PROCEDURE — 93306 TTE W/DOPPLER COMPLETE: CPT

## 2023-06-19 RX ORDER — REGADENOSON 0.08 MG/ML
0.4 INJECTION, SOLUTION INTRAVENOUS ONCE
Status: COMPLETED | OUTPATIENT
Start: 2023-06-19 | End: 2023-06-19

## 2023-06-19 RX ADMIN — REGADENOSON 0.4 MG: 0.08 INJECTION, SOLUTION INTRAVENOUS at 08:06

## 2023-06-20 ENCOUNTER — TELEPHONE (OUTPATIENT)
Dept: CARDIOLOGY | Facility: CLINIC | Age: 73
End: 2023-06-20
Payer: MEDICARE

## 2023-06-20 LAB
CV PHARM DOSE: 0.4 MG
CV STRESS BASE HR: 68 BPM
DIASTOLIC BLOOD PRESSURE: 72 MMHG
EJECTION FRACTION- HIGH: 65 %
END DIASTOLIC INDEX-HIGH: 153 ML/M2
END DIASTOLIC INDEX-LOW: 93 ML/M2
END SYSTOLIC INDEX-HIGH: 71 ML/M2
END SYSTOLIC INDEX-LOW: 31 ML/M2
NUC REST DIASTOLIC VOLUME INDEX: 101
NUC REST EJECTION FRACTION: 60
NUC REST SYSTOLIC VOLUME INDEX: 40
NUC STRESS DIASTOLIC VOLUME INDEX: 94
NUC STRESS EJECTION FRACTION: 56 %
NUC STRESS SYSTOLIC VOLUME INDEX: 41
OHS CV CPX 1 MINUTE RECOVERY HEART RATE: 88 BPM
OHS CV CPX 85 PERCENT MAX PREDICTED HEART RATE MALE: 122
OHS CV CPX MAX PREDICTED HEART RATE: 144
OHS CV CPX PATIENT IS FEMALE: 1
OHS CV CPX PATIENT IS MALE: 0
OHS CV CPX PEAK DIASTOLIC BLOOD PRESSURE: 70 MMHG
OHS CV CPX PEAK HEAR RATE: 88 BPM
OHS CV CPX PEAK RATE PRESSURE PRODUCT: NORMAL
OHS CV CPX PEAK SYSTOLIC BLOOD PRESSURE: 142 MMHG
OHS CV CPX PERCENT MAX PREDICTED HEART RATE ACHIEVED: 61
OHS CV CPX RATE PRESSURE PRODUCT PRESENTING: 9656
RETIRED EF AND QEF - SEE NOTES: 53 %
SYSTOLIC BLOOD PRESSURE: 142 MMHG

## 2023-06-20 NOTE — TELEPHONE ENCOUNTER
----- Message from Argenis Garcia NP sent at 6/20/2023  1:02 PM CDT -----  No concerning or significant abnormalities noted on echocardiogram. Can be discussed further at your next office visit.

## 2023-06-22 ENCOUNTER — TELEPHONE (OUTPATIENT)
Dept: CARDIOLOGY | Facility: CLINIC | Age: 73
End: 2023-06-22
Payer: MEDICARE

## 2023-06-22 NOTE — TELEPHONE ENCOUNTER
----- Message from Argenis Garcia NP sent at 6/22/2023  4:20 PM CDT -----  Stress test is negative for reversible ischemia. This means there does not appear to be any blockages that are causing a problem (meaning over 70% blocked). According to this test, you are getting enough blood flow to the coronary arteries (the arteries of the heart). The test is about 90% accurate. However if you are having symptoms, we can discuss at follow up unless symptoms are severe in which case please go to the nearest ER. If you are doing well and would like to move your next appointment to a later date, please contact our office.

## 2023-07-17 ENCOUNTER — TELEPHONE (OUTPATIENT)
Dept: CARDIOLOGY | Facility: CLINIC | Age: 73
End: 2023-07-17
Payer: MEDICARE

## 2023-07-17 NOTE — TELEPHONE ENCOUNTER
----- Message from Gold Teague sent at 7/14/2023  1:38 PM CDT -----  Type: Patient Call Back         Who called: Pt          What is the request in detail: pt called in regarding her results from her ECHO that was done on the 6/19/23 . Pt has an up coming procedure on 7/27/23          Can the clinic reply by [facility]?no          Would the patient rather a call back or a response via [facility]? Call back          Best call back number: [phone] (mobile)          Additional Information:           Thank You

## 2023-09-19 RX ORDER — LANOLIN ALCOHOL/MO/W.PET/CERES
0.5 CREAM (GRAM) TOPICAL EVERY OTHER DAY
Qty: 24 TABLET | Refills: 2 | Status: SHIPPED | OUTPATIENT
Start: 2023-09-19 | End: 2023-11-08 | Stop reason: SDUPTHER

## 2023-09-19 NOTE — TELEPHONE ENCOUNTER
----- Message from Jm Metz sent at 9/19/2023 12:36 PM CDT -----  Regarding: Refill  Contact: patient  Type:  RX Refill Request    Who Called: patient  Refill or New Rx:Refill  RX Name and Strength:magnesium oxide (MAG-OX) 400 mg (241.3 mg magnesium) tablet  How is the patient currently taking it? (ex. 1XDay):  Is this a 30 day or 90 day RX:  Preferred Pharmacy with phone number:  [id number] - [place], [place] - [address]  [address]  [place] [address]  Phone: [phone] Fax: [phone]  Local or Mail Order:local  Ordering Provider:Isaiah  Would the patient rather a call back or a response via [facility]? refill  Best Call Back Number:[phone]  Additional Information:

## 2023-11-08 ENCOUNTER — OFFICE VISIT (OUTPATIENT)
Dept: CARDIOLOGY | Facility: CLINIC | Age: 73
End: 2023-11-08
Payer: MEDICARE

## 2023-11-08 VITALS
WEIGHT: 243 LBS | OXYGEN SATURATION: 98 % | BODY MASS INDEX: 34.02 KG/M2 | DIASTOLIC BLOOD PRESSURE: 80 MMHG | RESPIRATION RATE: 16 BRPM | SYSTOLIC BLOOD PRESSURE: 126 MMHG | HEART RATE: 75 BPM | HEIGHT: 71 IN

## 2023-11-08 DIAGNOSIS — I48.0 PAROXYSMAL ATRIAL FIBRILLATION: Primary | ICD-10-CM

## 2023-11-08 DIAGNOSIS — E66.01 SEVERE OBESITY: ICD-10-CM

## 2023-11-08 DIAGNOSIS — E16.2 HYPOGLYCEMIA: ICD-10-CM

## 2023-11-08 DIAGNOSIS — E78.2 MIXED HYPERLIPIDEMIA: ICD-10-CM

## 2023-11-08 DIAGNOSIS — R00.2 PALPITATIONS: ICD-10-CM

## 2023-11-08 DIAGNOSIS — I10 ESSENTIAL HYPERTENSION: ICD-10-CM

## 2023-11-08 PROCEDURE — 99999 PR PBB SHADOW E&M-EST. PATIENT-LVL III: CPT | Mod: PBBFAC,,, | Performed by: INTERNAL MEDICINE

## 2023-11-08 PROCEDURE — 93005 ELECTROCARDIOGRAM TRACING: CPT | Mod: PBBFAC,PN | Performed by: GENERAL PRACTICE

## 2023-11-08 PROCEDURE — 93010 ELECTROCARDIOGRAM REPORT: CPT | Mod: S$PBB,,, | Performed by: GENERAL PRACTICE

## 2023-11-08 PROCEDURE — 99213 OFFICE O/P EST LOW 20 MIN: CPT | Mod: PBBFAC,PN | Performed by: INTERNAL MEDICINE

## 2023-11-08 PROCEDURE — 93010 EKG 12-LEAD: ICD-10-PCS | Mod: S$PBB,,, | Performed by: GENERAL PRACTICE

## 2023-11-08 PROCEDURE — 99999 PR PBB SHADOW E&M-EST. PATIENT-LVL III: ICD-10-PCS | Mod: PBBFAC,,, | Performed by: INTERNAL MEDICINE

## 2023-11-08 PROCEDURE — 99215 PR OFFICE/OUTPT VISIT, EST, LEVL V, 40-54 MIN: ICD-10-PCS | Mod: S$PBB,,, | Performed by: INTERNAL MEDICINE

## 2023-11-08 PROCEDURE — 99215 OFFICE O/P EST HI 40 MIN: CPT | Mod: S$PBB,,, | Performed by: INTERNAL MEDICINE

## 2023-11-08 RX ORDER — LANOLIN ALCOHOL/MO/W.PET/CERES
0.5 CREAM (GRAM) TOPICAL EVERY OTHER DAY
Qty: 90 TABLET | Refills: 3 | Status: SHIPPED | OUTPATIENT
Start: 2023-11-08

## 2023-11-08 NOTE — PROGRESS NOTES
Subjective:    Patient ID:  Noa Andrade is a 73 y.o. female     Chief Complaint   Patient presents with    Atrial Fibrillation    Hypertension       HPI:  Ms Noa Andrade is a 73 y.o. female   Is here for follow-up.    Patient has been doing well.  She recently had her right hip surgery and she is recovering well.  She recently had home health and now she is going for physical therapy as an outpatient.  And she is able to walk well she now uses a cane to help her walk.  Her breathing is good denies any shortness of breath or difficulty in breathing she denies any leg swelling she initially had some swelling but it is most of it is resolved.  Occasionally she has some fluttering in the chest and is transient it goes away.  Sometimes when she wakes up in the morning and she feels a funny sensation at that time and she feels weak and almost has a fluttering sensation and after short while it goes away.  It does not happen often but it happens occasionally.    Review of patient's allergies indicates:   Allergen Reactions    Clopidogrel Itching     Other reaction(s): Itch  Other reaction(s): Itching  Other reaction(s): Itch  Other reaction(s): Itching       Past Medical History:   Diagnosis Date    Hyperlipidemia     Hypertension      History reviewed. No pertinent surgical history.  Social History     Tobacco Use    Smoking status: Never    Smokeless tobacco: Never   Substance Use Topics    Alcohol use: Not Currently    Drug use: Not Currently     Family History   Problem Relation Age of Onset    Ulcers Mother     Prostate cancer Father         Review of Systems:   Constitution: Negative for diaphoresis and fever.   HEENT: Negative for nosebleeds.    Cardiovascular: Negative for chest pain       No dyspnea on exertion       No leg swelling          Intermittent palpitations  Respiratory: Negative for shortness of breath and wheezing.    Hematologic/Lymphatic: Negative for bleeding problem. Does not bruise/bleed easily.  "  Skin: Negative for color change and rash.   Musculoskeletal: Negative for falls and myalgias.   Gastrointestinal: Negative for hematemesis and hematochezia.   Genitourinary: Negative for hematuria.   Neurological: Negative for dizziness and light-headedness.   Psychiatric/Behavioral: Negative for altered mental status and memory loss.          Objective:        Vitals:    11/08/23 1403   BP: 126/80   Pulse: 75   Resp: 16       No results found for: "WBC", "HGB", "HCT", "PLT", "CHOL", "TRIG", "HDL", "LDLDIRECT", "ALT", "AST", "NA", "K", "CL", "CREATININE", "BUN", "CO2", "TSH", "PSA", "INR", "GLUF", "HGBA1C", "MICROALBUR"     ECHOCARDIOGRAM RESULTS  Results for orders placed during the hospital encounter of 06/19/23    Echo    Interpretation Summary  · The left ventricle is normal in size with mild concentric hypertrophy and normal systolic function.  · The estimated ejection fraction is 60%.  · Normal left ventricular diastolic function.  · Normal right ventricular size with normal right ventricular systolic function.  · Mild mitral regurgitation.        CURRENT/PREVIOUS VISIT EKG  Results for orders placed or performed in visit on 05/03/23   IN OFFICE EKG 12-LEAD (to [place])    Collection Time: 05/03/23  4:10 PM    Narrative    Test Reason : I48.0,    Vent. Rate : 082 BPM     Atrial Rate : 082 BPM     P-R Int : 134 ms          QRS Dur : 084 ms      QT Int : 380 ms       P-R-T Axes : 064 076 019 degrees     QTc Int : 443 ms    Normal sinus rhythm  Nonspecific ST abnormality  Abnormal ECG  When compared with ECG of 16-SEP-2022 13:30,  No significant change was found  Confirmed by Isaiah Renee MD ([id number]) on 6/1/2023 9:41:00 AM    Referred By:             Confirmed By:Isaiah Renee MD     No valid procedures specified.   Results for orders placed during the hospital encounter of 06/19/23    Nuclear Stress - Cardiology Interpreted    Interpretation Summary    Normal myocardial perfusion scan. There is no evidence of " myocardial ischemia or infarction.    There is a trivial to mild intensity perfusion abnormality in the anteroapical wall of the left ventricle, secondary to breast attenuation.    The gated perfusion images showed an ejection fraction of 60% at rest. The gated perfusion images showed an ejection fraction of 56% post stress. Normal ejection fraction is greater than 53%.    There is normal wall motion at rest and post stress.    LV cavity size is normal at rest and normal at stress.    The ECG portion of the study is negative for ischemia.    The patient reported chest pain during the stress test.    There were no arrhythmias during stress.      Physical Exam:  CONSTITUTIONAL: No fever, no chills  HEENT: Normocephalic, atraumatic,pupils reactive to light                 NECK:  No JVD no carotid bruit  CVS: S1S2+, RRR,  systolic murmurs,   LUNGS: Clear  ABDOMEN: Soft, NT, BS+  EXTREMITIES: No cyanosis, edema  : No jj catheter  NEURO: AAO X 3  PSY: Normal affect      Medication List with Changes/Refills   Current Medications    AMLODIPINE (NORVASC) 2.5 MG TABLET    TAKE 1 TABLET BY MOUTH EVERY DAY    ASPIRIN (ECOTRIN) 81 MG EC TABLET    Take 81 mg by mouth once daily.    ATORVASTATIN (LIPITOR) 10 MG TABLET    Take 10 mg by mouth once daily.    ATORVASTATIN (LIPITOR) 40 MG TABLET    Take 20 mg by mouth.    CARVEDILOL PHOSPHATE 20 MG ORAL CM24 (COREG CR) 20 MG 24 HR CAPSULE    Take 1 capsule (20 mg total) by mouth once daily.    CLOBETASOL (TEMOVATE) 0.05 % CREAM    PLEASE SEE ATTACHED FOR DETAILED DIRECTIONS    FLUTICASONE PROPIONATE (FLONASE) 50 MCG/ACTUATION NASAL SPRAY    1 spray by Each Nostril route 2 (two) times daily.    GABAPENTIN (NEURONTIN) 300 MG CAPSULE    Take by mouth.    MELOXICAM (MOBIC) 7.5 MG TABLET    7.5 mg.    NAPROXEN (NAPROSYN) 375 MG TABLET    Take 375 mg by mouth 2 (two) times daily with meals.    VALACYCLOVIR (VALTREX) 1000 MG TABLET    Take 1,000 mg by mouth every 12 (twelve) hours.    Changed and/or Refilled Medications    Modified Medication Previous Medication    MAGNESIUM OXIDE (MAG-OX) 400 MG (241.3 MG MAGNESIUM) TABLET magnesium oxide (MAG-OX) 400 mg (241.3 mg magnesium) tablet       Take 0.5 tablets (200 mg total) by mouth every other day.    Take 0.5 tablets (200 mg total) by mouth every other day.             Assessment:       1. Paroxysmal atrial fibrillation    2. Palpitations    3. Essential hypertension    4. Mixed hyperlipidemia    5. Severe obesity    6. Hypoglycemia         Plan:    1. Paroxysmal atrial fibrillation   Patient is currently doing well occasionally she has palpitation.  Will do 2 week Zio monitoring to evaluate for her rhythm.  Patient has CHADS-VASc score of 1.  Consideration for anticoagulation.  Given the age.  2.  Essential hypertension   Patient's blood pressure is stable at 120 6/80 and she is currently on carvedilol 20 mg p.o. daily and she is also on amlodipine 2.5 mg p.o. daily.  3. Mixed hyperlipidemia  She is on atorvastatin 40 mg p.o. daily continue the same  and her primary physician is checking her cholesterol and liver function test.  4.  Right hip surgery   Patient had hip replacement done and she is doing very well postoperatively.  Patient to continue her physical therapy.  5. Severe obesity   Patient is stable at the present time.  6.  Hypoglycemia   Patient does have early morning episodes of transient weakness and fatigue and it gradually gets better in the morning.  Hopefully the Zio monitor will alert us to any rhythm changes.  And patient to check her blood sugars in the morning.  7.  EKG  Reviewed EKG independently patient is in normal sinus rhythm with a heart rate of 79 beats per minute nonspecific ST T-wave changes no significant change.  8.  Numbness in her left hand patient to follow-up with neurology for evaluation of carpal tunnel syndrome.  9. I will see her back in the office                Problem List Items Addressed This Visit           Cardiac/Vascular    Paroxysmal atrial fibrillation - Primary    Relevant Orders    IN OFFICE EKG 12-LEAD (to [place])    Palpitations    Essential hypertension    Mixed hyperlipidemia       Endocrine    Hypoglycemia    Severe obesity       No follow-ups on file.

## 2023-11-10 ENCOUNTER — TELEPHONE (OUTPATIENT)
Dept: CARDIOLOGY | Facility: CLINIC | Age: 73
End: 2023-11-10
Payer: MEDICARE

## 2023-11-10 NOTE — TELEPHONE ENCOUNTER
----- Message from Eddajesica Angelina sent at 11/10/2023  3:04 PM CST -----  Regarding: Pt Advice  Needs Medical Advice      Who Called: Pt        Would the patient rather a call back or a response via [facility]?  Call back    Best Call Back Number:  [phone]      Additional Information: Sts forgot to  a paper after her appointment with getting her monitor. Is wanting to know if it was important information she needed to know and if she could get it mailed or emailed to her.    Please Advise - Thank you

## 2023-11-30 ENCOUNTER — TELEPHONE (OUTPATIENT)
Dept: CARDIOLOGY | Facility: CLINIC | Age: 73
End: 2023-11-30
Payer: MEDICARE

## 2023-11-30 NOTE — TELEPHONE ENCOUNTER
----- Message from Mary Armando, Patient Care Assistant sent at 11/30/2023  2:38 PM CST -----  Contact: self  Pt is calling to see if her Holter monitor  results have came in. Please call back to advise [phone]  thanks

## 2023-12-06 ENCOUNTER — OFFICE VISIT (OUTPATIENT)
Dept: CARDIOLOGY | Facility: CLINIC | Age: 73
End: 2023-12-06
Payer: MEDICARE

## 2023-12-06 VITALS
BODY MASS INDEX: 33.88 KG/M2 | SYSTOLIC BLOOD PRESSURE: 140 MMHG | DIASTOLIC BLOOD PRESSURE: 70 MMHG | HEART RATE: 76 BPM | HEIGHT: 71 IN | OXYGEN SATURATION: 98 % | WEIGHT: 242 LBS | RESPIRATION RATE: 16 BRPM

## 2023-12-06 DIAGNOSIS — E78.2 MIXED HYPERLIPIDEMIA: ICD-10-CM

## 2023-12-06 DIAGNOSIS — I47.20 VENTRICULAR TACHYCARDIA: Primary | ICD-10-CM

## 2023-12-06 DIAGNOSIS — I10 ESSENTIAL HYPERTENSION: ICD-10-CM

## 2023-12-06 DIAGNOSIS — E16.2 HYPOGLYCEMIA: ICD-10-CM

## 2023-12-06 DIAGNOSIS — E66.01 SEVERE OBESITY: ICD-10-CM

## 2023-12-06 DIAGNOSIS — I48.0 PAROXYSMAL ATRIAL FIBRILLATION: ICD-10-CM

## 2023-12-06 DIAGNOSIS — R00.2 PALPITATIONS: ICD-10-CM

## 2023-12-06 DIAGNOSIS — I25.10 CAD IN NATIVE ARTERY: ICD-10-CM

## 2023-12-06 DIAGNOSIS — I47.10 PAROXYSMAL SVT (SUPRAVENTRICULAR TACHYCARDIA): ICD-10-CM

## 2023-12-06 PROCEDURE — 99213 OFFICE O/P EST LOW 20 MIN: CPT | Mod: PBBFAC,PN | Performed by: INTERNAL MEDICINE

## 2023-12-06 PROCEDURE — 99214 OFFICE O/P EST MOD 30 MIN: CPT | Mod: S$PBB,,, | Performed by: INTERNAL MEDICINE

## 2023-12-06 PROCEDURE — 99999 PR PBB SHADOW E&M-EST. PATIENT-LVL III: CPT | Mod: PBBFAC,,, | Performed by: INTERNAL MEDICINE

## 2023-12-06 PROCEDURE — 99214 PR OFFICE/OUTPT VISIT, EST, LEVL IV, 30-39 MIN: ICD-10-PCS | Mod: S$PBB,,, | Performed by: INTERNAL MEDICINE

## 2023-12-06 PROCEDURE — 99999 PR PBB SHADOW E&M-EST. PATIENT-LVL III: ICD-10-PCS | Mod: PBBFAC,,, | Performed by: INTERNAL MEDICINE

## 2023-12-06 RX ORDER — METOPROLOL SUCCINATE 25 MG/1
25 TABLET, EXTENDED RELEASE ORAL 2 TIMES DAILY
Qty: 60 TABLET | Refills: 11 | Status: SHIPPED | OUTPATIENT
Start: 2023-12-06 | End: 2024-12-05

## 2023-12-06 NOTE — PROGRESS NOTES
Subjective:    Patient ID:  Noa Andrade is a 73 y.o. female     Chief Complaint   Patient presents with    Atrial Fibrillation    Hypertension    Hyperlipidemia       HPI:  Ms Noa Andrade is a 73 y.o. female is here for her test results.    Patient was found to have ventricular tachycardia and supraventricular tachycardia episodes on her recent cardiac monitoring.  And she was asked to come in.    Patient otherwise is doing well no specific complaints.  Her breathing has been stable denies any chest pain or tightness or heaviness and denies any shortness a breath she still has a fluttering sensation in the heart.    Review of patient's allergies indicates:   Allergen Reactions    Clopidogrel Itching     Other reaction(s): Itch  Other reaction(s): Itching  Other reaction(s): Itch  Other reaction(s): Itching       Past Medical History:   Diagnosis Date    Hyperlipidemia     Hypertension      History reviewed. No pertinent surgical history.  Social History     Tobacco Use    Smoking status: Never    Smokeless tobacco: Never   Substance Use Topics    Alcohol use: Not Currently    Drug use: Not Currently     Family History   Problem Relation Age of Onset    Ulcers Mother     Prostate cancer Father         Review of Systems:   Constitution: Negative for diaphoresis and fever.   HEENT: Negative for nosebleeds.    Cardiovascular: Negative for chest pain       No dyspnea on exertion       No leg swelling        No palpitations  Respiratory: Negative for shortness of breath and wheezing.    Hematologic/Lymphatic: Negative for bleeding problem. Does not bruise/bleed easily.   Skin: Negative for color change and rash.   Musculoskeletal: Negative for falls and myalgias.   Gastrointestinal: Negative for hematemesis and hematochezia.   Genitourinary: Negative for hematuria.   Neurological: Negative for dizziness and light-headedness.   Psychiatric/Behavioral: Negative for altered mental status and memory loss.         "  Objective:        Vitals:    12/06/23 1518   BP: (!) 140/70   Pulse: 76   Resp: 16       No results found for: "WBC", "HGB", "HCT", "PLT", "CHOL", "TRIG", "HDL", "LDLDIRECT", "ALT", "AST", "NA", "K", "CL", "CREATININE", "BUN", "CO2", "TSH", "PSA", "INR", "GLUF", "HGBA1C", "MICROALBUR"     ECHOCARDIOGRAM RESULTS  Results for orders placed during the hospital encounter of 06/19/23    Echo    Interpretation Summary  · The left ventricle is normal in size with mild concentric hypertrophy and normal systolic function.  · The estimated ejection fraction is 60%.  · Normal left ventricular diastolic function.  · Normal right ventricular size with normal right ventricular systolic function.  · Mild mitral regurgitation.        CURRENT/PREVIOUS VISIT EKG  Results for orders placed or performed in visit on 11/08/23   IN OFFICE EKG 12-LEAD (to [facility])    Collection Time: 11/08/23  2:01 PM    Narrative    Test Reason : I48.0,    Vent. Rate : 079 BPM     Atrial Rate : 079 BPM     P-R Int : 140 ms          QRS Dur : 086 ms      QT Int : 384 ms       P-R-T Axes : 073 082 023 degrees     QTc Int : 440 ms    Normal sinus rhythm  Nonspecific ST abnormality  Abnormal ECG  When compared with ECG of 03-MAY-2023 16:10,  No significant change was found    Referred By:             Confirmed By:      No valid procedures specified.   Results for orders placed during the hospital encounter of 06/19/23    Nuclear Stress - Cardiology Interpreted    Interpretation Summary    Normal myocardial perfusion scan. There is no evidence of myocardial ischemia or infarction.    There is a trivial to mild intensity perfusion abnormality in the anteroapical wall of the left ventricle, secondary to breast attenuation.    The gated perfusion images showed an ejection fraction of 60% at rest. The gated perfusion images showed an ejection fraction of 56% post stress. Normal ejection fraction is greater than 53%.    There is normal wall motion at rest and post " stress.    LV cavity size is normal at rest and normal at stress.    The ECG portion of the study is negative for ischemia.    The patient reported chest pain during the stress test.    There were no arrhythmias during stress.      Physical Exam:  CONSTITUTIONAL: No fever, no chills  HEENT: Normocephalic, atraumatic,pupils reactive to light                 NECK:  No JVD no carotid bruit  CVS: S1S2+, RRR, systolic murmurs,   LUNGS: Clear  ABDOMEN: Soft, NT, BS+  EXTREMITIES: No cyanosis, edema  : No jj catheter  NEURO: AAO X 3  PSY: Normal affect      Medication List with Changes/Refills   Current Medications    AMLODIPINE (NORVASC) 2.5 MG TABLET    TAKE 1 TABLET BY MOUTH EVERY DAY    ASPIRIN (ECOTRIN) 81 MG EC TABLET    Take 81 mg by mouth once daily.    ATORVASTATIN (LIPITOR) 40 MG TABLET    Take 20 mg by mouth.    CARVEDILOL PHOSPHATE 20 MG ORAL CM24 (COREG CR) 20 MG 24 HR CAPSULE    Take 1 capsule (20 mg total) by mouth once daily.    CLOBETASOL (TEMOVATE) 0.05 % CREAM    PLEASE SEE ATTACHED FOR DETAILED DIRECTIONS    FLUTICASONE PROPIONATE (FLONASE) 50 MCG/ACTUATION NASAL SPRAY    1 spray by Each Nostril route 2 (two) times daily.    GABAPENTIN (NEURONTIN) 300 MG CAPSULE    Take by mouth.    MAGNESIUM OXIDE (MAG-OX) 400 MG (241.3 MG MAGNESIUM) TABLET    Take 0.5 tablets (200 mg total) by mouth every other day.    MELOXICAM (MOBIC) 7.5 MG TABLET    7.5 mg.    NAPROXEN (NAPROSYN) 375 MG TABLET    Take 375 mg by mouth 2 (two) times daily with meals.    VALACYCLOVIR (VALTREX) 1000 MG TABLET    Take 1,000 mg by mouth every 12 (twelve) hours.   Discontinued Medications    ATORVASTATIN (LIPITOR) 10 MG TABLET    Take 10 mg by mouth once daily.             Assessment:       1. Ventricular tachycardia    2. Paroxysmal SVT (supraventricular tachycardia)    3. Paroxysmal atrial fibrillation    4. Essential hypertension    5. Mixed hyperlipidemia    6. Severe obesity    7. Palpitations    8. Hypoglycemia    9. CAD in  native artery         Plan:   1. Ventricular Tachycardia   Patient had 3 runs of ventricular tachycardia the maximum heart rate was 20 beats per minute.  Discussed with patient in regards with ventricular tachycardia and options of starting metoprolol succinate 25 mg p.o. b.i.d. versus sotalol and amiodarone.    Discussed with patient that will start her on metoprolol succinate and see how she tolerates it.  Interestingly patient broke through carvedilol phosphate 20 mg p.o. daily that she was taking.  If patient still continues to have palpitations then certainly she will need either sotalol or amiodarone.    2. Paroxysmal supraventricular tachycardia   Patient had short runs of PSVT.  And hopefully the metoprolol will also take care of PSVT.    3. Paroxysmal atrial fibrillation   Patient has not had any episodes of AFib.  However the metoprolol succinate should help keeping the rhythm stable.  4. Essential hypertension   Her blood pressure is stable at the present time.  Is 140/70 and she is currently on carvedilol phosphate 20 mg p.o. daily and amlodipine 2.5 mg p.o. daily.  May need to increase amlodipine to 2.5 mg p.o. b.i.d..  5. Obesity   At the present time patient is stable.  Continue low-fat low-cholesterol diet.    6. CAD   Patient is stable at the present time she recently had a stress myocardial perfusion study which was essentially normal study.  7. I will see her back in the office in 4 weeks time.          Problem List Items Addressed This Visit          Cardiac/Vascular    Paroxysmal atrial fibrillation    Palpitations    Essential hypertension    Mixed hyperlipidemia       Endocrine    Hypoglycemia    Severe obesity     Other Visit Diagnoses       Ventricular tachycardia    -  Primary    Paroxysmal SVT (supraventricular tachycardia)        CAD in native artery                No follow-ups on file.

## 2023-12-11 ENCOUNTER — TELEPHONE (OUTPATIENT)
Dept: CARDIOLOGY | Facility: CLINIC | Age: 73
End: 2023-12-11
Payer: MEDICARE

## 2023-12-11 NOTE — TELEPHONE ENCOUNTER
----- Message from Nelly Bernal sent at 12/11/2023 11:16 AM CST -----  Contact: Patient  Type:  Needs Medical Advice    Who Called: Patient       Would the patient rather a call back or a response via [facility]? Call    Best Call Back Number: [phone] (home)     Additional Information: Patient has questions about a should injection and dental work. Please call to advise

## 2023-12-14 ENCOUNTER — TELEPHONE (OUTPATIENT)
Dept: CARDIOLOGY | Facility: CLINIC | Age: 73
End: 2023-12-14
Payer: MEDICARE

## 2023-12-14 NOTE — TELEPHONE ENCOUNTER
----- Message from Jm Metz sent at 12/14/2023  4:56 PM CST -----  Regarding: Return Call  Contact: patient  Type:  Patient Returning Call    Who Called:patient  Who Left Message for Patient:office staff  Does the patient know what this is regarding?:tomorrow appointment/ patient was told her appointment was in January/ Do she really need this appointment?  Would the patient rather a call back or a response via [facility]? Please call to advise  Best Call Back Number:[phone]  Additional Information:

## 2023-12-14 NOTE — TELEPHONE ENCOUNTER
----- Message from Andreas Becerra sent at 12/14/2023 10:06 AM CST -----  Type: Needs Medical Advice  Who Called:  Patient    Best Call Back Number:  [phone]  Additional Information: Patient states that her paperwork shows her scheduled in January for FU but Epic shows her scheduled on 12/15/23.  Please call to confirm if appointment was scheduled correctly.

## 2024-01-22 ENCOUNTER — LAB VISIT (OUTPATIENT)
Dept: LAB | Facility: HOSPITAL | Age: 74
End: 2024-01-22
Attending: INTERNAL MEDICINE
Payer: MEDICARE

## 2024-01-22 ENCOUNTER — OFFICE VISIT (OUTPATIENT)
Dept: CARDIOLOGY | Facility: CLINIC | Age: 74
End: 2024-01-22
Payer: MEDICARE

## 2024-01-22 VITALS
DIASTOLIC BLOOD PRESSURE: 70 MMHG | BODY MASS INDEX: 33.46 KG/M2 | OXYGEN SATURATION: 98 % | SYSTOLIC BLOOD PRESSURE: 128 MMHG | WEIGHT: 239 LBS | RESPIRATION RATE: 16 BRPM | HEIGHT: 71 IN

## 2024-01-22 DIAGNOSIS — R00.2 PALPITATIONS: ICD-10-CM

## 2024-01-22 DIAGNOSIS — E66.01 SEVERE OBESITY: ICD-10-CM

## 2024-01-22 DIAGNOSIS — R94.39 ABNORMAL STRESS TEST: ICD-10-CM

## 2024-01-22 DIAGNOSIS — I10 ESSENTIAL HYPERTENSION: ICD-10-CM

## 2024-01-22 DIAGNOSIS — E16.2 HYPOGLYCEMIA: ICD-10-CM

## 2024-01-22 DIAGNOSIS — I47.20 VENTRICULAR TACHYCARDIA: ICD-10-CM

## 2024-01-22 DIAGNOSIS — I48.0 PAROXYSMAL ATRIAL FIBRILLATION: ICD-10-CM

## 2024-01-22 DIAGNOSIS — I25.10 CAD IN NATIVE ARTERY: ICD-10-CM

## 2024-01-22 DIAGNOSIS — I47.20 VENTRICULAR TACHYCARDIA: Primary | ICD-10-CM

## 2024-01-22 LAB
ANION GAP SERPL CALC-SCNC: 7 MMOL/L (ref 8–16)
BUN SERPL-MCNC: 19 MG/DL (ref 8–23)
CALCIUM SERPL-MCNC: 8.9 MG/DL (ref 8.7–10.5)
CHLORIDE SERPL-SCNC: 105 MMOL/L (ref 95–110)
CO2 SERPL-SCNC: 26 MMOL/L (ref 23–29)
CREAT SERPL-MCNC: 0.9 MG/DL (ref 0.5–1.4)
EST. GFR  (NO RACE VARIABLE): >60 ML/MIN/1.73 M^2
GLUCOSE SERPL-MCNC: 70 MG/DL (ref 70–110)
MAGNESIUM SERPL-MCNC: 2 MG/DL (ref 1.6–2.6)
POTASSIUM SERPL-SCNC: 4.1 MMOL/L (ref 3.5–5.1)
SODIUM SERPL-SCNC: 138 MMOL/L (ref 136–145)

## 2024-01-22 PROCEDURE — 83735 ASSAY OF MAGNESIUM: CPT | Performed by: INTERNAL MEDICINE

## 2024-01-22 PROCEDURE — 99215 OFFICE O/P EST HI 40 MIN: CPT | Mod: S$PBB,,, | Performed by: INTERNAL MEDICINE

## 2024-01-22 PROCEDURE — 80048 BASIC METABOLIC PNL TOTAL CA: CPT | Performed by: INTERNAL MEDICINE

## 2024-01-22 PROCEDURE — 99213 OFFICE O/P EST LOW 20 MIN: CPT | Mod: PBBFAC,PN | Performed by: INTERNAL MEDICINE

## 2024-01-22 PROCEDURE — 36415 COLL VENOUS BLD VENIPUNCTURE: CPT | Performed by: INTERNAL MEDICINE

## 2024-01-22 PROCEDURE — 99999 PR PBB SHADOW E&M-EST. PATIENT-LVL III: CPT | Mod: PBBFAC,,, | Performed by: INTERNAL MEDICINE

## 2024-01-22 NOTE — PROGRESS NOTES
Subjective:    Patient ID:  Noa Andrade is a 73 y.o. female     Chief Complaint   Patient presents with    Hyperlipidemia    Hypertension       HPI:  Ms Noa Andrade is a 73 y.o. female is here for follow-up.    Patient recently had a cardiac monitor done and is here for the results.    Patient has been doing well except that she has been having episodes of lightheadedness and dizziness and also a flight of fluttering sensation in the chest.  And sometimes it lasts briefly sometimes it lasts little bit longer.  Patient denies any loss of consciousness falls or head injury.  She is taking all her medications regularly she is also taking her metoprolol succinate 25 mg twice daily on regular basis.    Review of patient's allergies indicates:   Allergen Reactions    Clopidogrel Itching     Other reaction(s): Itch  Other reaction(s): Itching  Other reaction(s): Itch  Other reaction(s): Itching       Past Medical History:   Diagnosis Date    Hyperlipidemia     Hypertension      History reviewed. No pertinent surgical history.  Social History     Tobacco Use    Smoking status: Never    Smokeless tobacco: Never   Substance Use Topics    Alcohol use: Not Currently    Drug use: Not Currently     Family History   Problem Relation Age of Onset    Ulcers Mother     Prostate cancer Father         Review of Systems:   Constitution: Negative for diaphoresis and fever.   HEENT: Negative for nosebleeds.    Cardiovascular: Negative for chest pain       No dyspnea on exertion       No leg swelling       Recurrent palpitations  Respiratory: Negative for shortness of breath and wheezing.    Hematologic/Lymphatic: Negative for bleeding problem. Does not bruise/bleed easily.   Skin: Negative for color change and rash.   Musculoskeletal: Negative for falls and myalgias.   Gastrointestinal: Negative for hematemesis and hematochezia.   Genitourinary: Negative for hematuria.   Neurological:  Positive for dizziness and light-headedness.  "  Psychiatric/Behavioral: Negative for altered mental status and memory loss.          Objective:        Vitals:    01/22/24 1338   BP: 128/70   Pulse: (P) 77   Resp: 16       No results found for: "WBC", "HGB", "HCT", "PLT", "CHOL", "TRIG", "HDL", "LDLDIRECT", "ALT", "AST", "NA", "K", "CL", "CREATININE", "BUN", "CO2", "TSH", "PSA", "INR", "GLUF", "HGBA1C", "MICROALBUR"     ECHOCARDIOGRAM RESULTS  Results for orders placed during the hospital encounter of 06/19/23    Echo    Interpretation Summary  · The left ventricle is normal in size with mild concentric hypertrophy and normal systolic function.  · The estimated ejection fraction is 60%.  · Normal left ventricular diastolic function.  · Normal right ventricular size with normal right ventricular systolic function.  · Mild mitral regurgitation.        CURRENT/PREVIOUS VISIT EKG  Results for orders placed or performed in visit on 11/08/23   IN OFFICE EKG 12-LEAD (to [facility])    Collection Time: 11/08/23  2:01 PM    Narrative    Test Reason : I48.0,    Vent. Rate : 079 BPM     Atrial Rate : 079 BPM     P-R Int : 140 ms          QRS Dur : 086 ms      QT Int : 384 ms       P-R-T Axes : 073 082 023 degrees     QTc Int : 440 ms    Normal sinus rhythm  Nonspecific ST abnormality  Abnormal ECG  When compared with ECG of 03-MAY-2023 16:10,  No significant change was found  Confirmed by Montserrat TRAN, Johan MOYA ([id number]) on 12/11/2023 11:09:01 PM    Referred By:             Confirmed By:Johan Mariano MD     No valid procedures specified.   Results for orders placed during the hospital encounter of 06/19/23    Nuclear Stress - Cardiology Interpreted    Interpretation Summary    Normal myocardial perfusion scan. There is no evidence of myocardial ischemia or infarction.    There is a trivial to mild intensity perfusion abnormality in the anteroapical wall of the left ventricle, secondary to breast attenuation.    The gated perfusion images showed an ejection fraction of 60% at rest. " The gated perfusion images showed an ejection fraction of 56% post stress. Normal ejection fraction is greater than 53%.    There is normal wall motion at rest and post stress.    LV cavity size is normal at rest and normal at stress.    The ECG portion of the study is negative for ischemia.    The patient reported chest pain during the stress test.    There were no arrhythmias during stress.      Physical Exam:  CONSTITUTIONAL: No fever, no chills  HEENT: Normocephalic, atraumatic,pupils reactive to light                 NECK:  No JVD no carotid bruit  CVS: S1S2+, RRR, systolic murmurs,   LUNGS: Clear  ABDOMEN: Soft, NT, BS+  EXTREMITIES: No cyanosis, edema  : No jj catheter  NEURO: AAO X 3  PSY: Normal affect      Medication List with Changes/Refills   Current Medications    AMLODIPINE (NORVASC) 2.5 MG TABLET    TAKE 1 TABLET BY MOUTH EVERY DAY    ASPIRIN (ECOTRIN) 81 MG EC TABLET    Take 81 mg by mouth once daily.    ATORVASTATIN (LIPITOR) 40 MG TABLET    Take 20 mg by mouth.    CLOBETASOL (TEMOVATE) 0.05 % CREAM    PLEASE SEE ATTACHED FOR DETAILED DIRECTIONS    FLUTICASONE PROPIONATE (FLONASE) 50 MCG/ACTUATION NASAL SPRAY    1 spray by Each Nostril route 2 (two) times daily.    GABAPENTIN (NEURONTIN) 300 MG CAPSULE    Take by mouth.    MAGNESIUM OXIDE (MAG-OX) 400 MG (241.3 MG MAGNESIUM) TABLET    Take 0.5 tablets (200 mg total) by mouth every other day.    MELOXICAM (MOBIC) 7.5 MG TABLET    7.5 mg.    METOPROLOL SUCCINATE (TOPROL-XL) 25 MG 24 HR TABLET    Take 1 tablet (25 mg total) by mouth 2 (two) times daily.    NAPROXEN (NAPROSYN) 375 MG TABLET    Take 375 mg by mouth 2 (two) times daily with meals.    VALACYCLOVIR (VALTREX) 1000 MG TABLET    Take 1,000 mg by mouth every 12 (twelve) hours.             Assessment:       1. Ventricular tachycardia    2. Paroxysmal atrial fibrillation    3. Essential hypertension    4. Severe obesity    5. Palpitations    6. Hypoglycemia    7. CAD in native artery    8.  Abnormal stress test         Plan:   1. Ventricular tachycardia  Discussed with patient in regards with VT/SVT/AFib.  Patient had a recent cardiac monitor done and she has had episodes of nonsustained ventricular tachycardia.  She is on metoprolol succinate 25 mg p.o. b.i.d..  Discussed with patient in regards with the nonsustained ventricular tachycardia options of seeing an electrophysiologist.  Needs further cardiac evaluation both for her ventricular tachycardia as well as supraventricular tachycardia.  2. Consideration for possible sotalol 80 mg p.o. b.i.d. and this has to be done as an inpatient and patient needs to be admitted for the same.  And she needs to be monitored for 36 hours before being discharged home on the medication.  3. Paroxysmal atrial fibrillation   She has not had any further episodes of AFib.  Her CHADS-VASc score is 1 and at the present time she is on aspirin 81 mg p.o. daily.  4. Discussed with patient that in regards with ventricular tachycardia and paroxysmal AFib and SVT she needs EP evaluation.  5. Essential hypertension   Patient's blood pressure is adequately controlled is 128/70 and she is on metoprolol succinate 25 mg p.o. b.i.d. and amlodipine 2.5 mg p.o. daily.  6. Hypoglycemia   Patient has episodes of hypoglycemia intermittently.  She follows up with the primary physician.  7. EKG  Reviewed her EKG from November 8, 2023 at that time she has normal sinus rhythm with nonspecific ST T-wave changes.  8. Will also get a cath report of her previous cardiac catheterization done at [facility] in [place].  To my recollection as well as patient's recollection she had a catheterization in [place] and at that time she had patent coronaries.  And that is what patient also remembers.  9. I will see her back in the office after she has seen the electrophysiologist.            Problem List Items Addressed This Visit          Cardiac/Vascular    Paroxysmal atrial fibrillation     Abnormal stress test    Palpitations    Essential hypertension       Endocrine    Hypoglycemia    Severe obesity     Other Visit Diagnoses       Ventricular tachycardia    -  Primary    CAD in native artery                No follow-ups on file.

## 2024-01-24 ENCOUNTER — OFFICE VISIT (OUTPATIENT)
Dept: CARDIOLOGY | Facility: CLINIC | Age: 74
End: 2024-01-24
Payer: MEDICARE

## 2024-01-24 VITALS
BODY MASS INDEX: 33.44 KG/M2 | DIASTOLIC BLOOD PRESSURE: 74 MMHG | WEIGHT: 238.88 LBS | RESPIRATION RATE: 16 BRPM | SYSTOLIC BLOOD PRESSURE: 126 MMHG | OXYGEN SATURATION: 98 % | HEART RATE: 80 BPM | HEIGHT: 71 IN

## 2024-01-24 DIAGNOSIS — I49.3 PVC (PREMATURE VENTRICULAR CONTRACTION): ICD-10-CM

## 2024-01-24 DIAGNOSIS — I10 ESSENTIAL HYPERTENSION: Primary | ICD-10-CM

## 2024-01-24 PROBLEM — I48.0 PAROXYSMAL ATRIAL FIBRILLATION: Status: RESOLVED | Noted: 2021-03-26 | Resolved: 2024-01-24

## 2024-01-24 PROCEDURE — 99999 PR PBB SHADOW E&M-EST. PATIENT-LVL III: CPT | Mod: PBBFAC,,, | Performed by: INTERNAL MEDICINE

## 2024-01-24 PROCEDURE — 99205 OFFICE O/P NEW HI 60 MIN: CPT | Mod: S$PBB,,, | Performed by: INTERNAL MEDICINE

## 2024-01-24 PROCEDURE — 99213 OFFICE O/P EST LOW 20 MIN: CPT | Mod: PBBFAC,PN | Performed by: INTERNAL MEDICINE

## 2024-01-24 NOTE — PROGRESS NOTES
Subjective:     HPI    I had the pleasure of seeing Noa Andrade in consultation at your request for the evaluation of NSVT. She is a 73F with HTN, HLD, R hip replacement in 7/2023, who began to have episodes of chest pressure with lightheadedness and weakness starting in 10/2023. No syncope. A 14 day ZioXT monitor done in 11/2023 showed sinus rhythm average HR 81 bpm (range  bpm), PAC and PVC burden <1%, 3 runs of NSVT (longest 11.9 seconds), 8 runs of nonsustained AT (longest 17 beats), no sustained arrhythmias, 3 diary entries correlating with NSR and NSR with PVCs.    Coreg switched to toprol xl 25 mg bid about 1 month ago. Pt still has episodes of chest pressure, generally at night. Gets up to walk and feels lightheaded.     Echo in 6/2023 showed EF 60%. Regadenoson SPECT in 6/2023 showed no ischemia.    Review of Systems   Constitutional: Negative for decreased appetite, malaise/fatigue, weight gain and weight loss.   HENT:  Negative for sore throat.    Eyes:  Negative for blurred vision.   Cardiovascular:  Positive for irregular heartbeat and palpitations. Negative for chest pain, dyspnea on exertion, leg swelling, near-syncope, orthopnea, paroxysmal nocturnal dyspnea and syncope.   Respiratory:  Negative for shortness of breath.    Skin:  Negative for rash.   Musculoskeletal:  Negative for arthritis.   Gastrointestinal:  Negative for abdominal pain.   Neurological:  Positive for light-headedness. Negative for focal weakness.   Psychiatric/Behavioral:  Negative for altered mental status.        Objective:   Physical Exam  Vitals and nursing note reviewed.   Constitutional:       General: She is not in acute distress.     Appearance: She is well-developed.   HENT:      Head: Normocephalic and atraumatic.   Eyes:      General: No scleral icterus.     Conjunctiva/sclera: Conjunctivae normal.      Pupils: Pupils are equal, round, and reactive to light.   Neck:      Thyroid: No thyromegaly.      Vascular: No  JVD.   Cardiovascular:      Rate and Rhythm: Normal rate and regular rhythm.      Pulses: Intact distal pulses.      Heart sounds: Normal heart sounds. No murmur heard.     No friction rub. No gallop.   Pulmonary:      Effort: Pulmonary effort is normal. No respiratory distress.      Breath sounds: Normal breath sounds.   Abdominal:      General: Bowel sounds are normal. There is no distension.      Palpations: Abdomen is soft.   Musculoskeletal:      Cervical back: Normal range of motion and neck supple.   Skin:     General: Skin is warm and dry.   Neurological:      Mental Status: She is alert and oriented to person, place, and time.   Psychiatric:         Behavior: Behavior normal.         Assessment:      1. Paroxysmal atrial fibrillation    2. Essential hypertension    3. PVC (premature ventricular contraction)        Plan:     In summary, Noa Andrade is a 73F with a history of idiopathic VT. Reassurance provided. Based on her history it does not sound like these episodes are symptomatic (she describes dull chest discomfort and lightheadedness when changing positions as opposed to palpitations). Plan is to continue toprol xl 25 mg bid, 3 day Holter in 3 months, follow-up 6 months.    Thank you for allowing me to participate in the care of this patient. Please do not hesitate to call me with any questions or concerns.

## 2024-03-21 ENCOUNTER — HOSPITAL ENCOUNTER (OUTPATIENT)
Dept: CARDIOLOGY | Facility: CLINIC | Age: 74
Discharge: HOME | End: 2024-03-21
Attending: INTERNAL MEDICINE
Payer: MEDICARE

## 2024-03-21 ENCOUNTER — OFFICE VISIT (OUTPATIENT)
Dept: CARDIOLOGY | Facility: CLINIC | Age: 74
End: 2024-03-21
Payer: MEDICARE

## 2024-03-21 VITALS
DIASTOLIC BLOOD PRESSURE: 70 MMHG | WEIGHT: 246 LBS | BODY MASS INDEX: 34.44 KG/M2 | RESPIRATION RATE: 16 BRPM | HEART RATE: 78 BPM | OXYGEN SATURATION: 98 % | SYSTOLIC BLOOD PRESSURE: 122 MMHG | HEIGHT: 71 IN

## 2024-03-21 DIAGNOSIS — R00.2 PALPITATIONS: ICD-10-CM

## 2024-03-21 DIAGNOSIS — I10 ESSENTIAL HYPERTENSION: ICD-10-CM

## 2024-03-21 DIAGNOSIS — I47.10 PAROXYSMAL SVT (SUPRAVENTRICULAR TACHYCARDIA): ICD-10-CM

## 2024-03-21 DIAGNOSIS — R00.2 PALPITATIONS: Primary | ICD-10-CM

## 2024-03-21 DIAGNOSIS — E66.01 SEVERE OBESITY: ICD-10-CM

## 2024-03-21 DIAGNOSIS — I47.20 VENTRICULAR TACHYCARDIA: ICD-10-CM

## 2024-03-21 DIAGNOSIS — I25.10 CAD IN NATIVE ARTERY: ICD-10-CM

## 2024-03-21 DIAGNOSIS — E78.2 MIXED HYPERLIPIDEMIA: ICD-10-CM

## 2024-03-21 PROCEDURE — 99999 PR PBB SHADOW E&M-EST. PATIENT-LVL III: CPT | Mod: PBBFAC,,, | Performed by: INTERNAL MEDICINE

## 2024-03-21 PROCEDURE — 99214 OFFICE O/P EST MOD 30 MIN: CPT | Mod: S$PBB,,, | Performed by: INTERNAL MEDICINE

## 2024-03-21 PROCEDURE — 93010 ELECTROCARDIOGRAM REPORT: CPT | Mod: 59,S$PBB,ICN, | Performed by: INTERNAL MEDICINE

## 2024-03-21 PROCEDURE — 93005 ELECTROCARDIOGRAM TRACING: CPT | Mod: PBBFAC,PN | Performed by: INTERNAL MEDICINE

## 2024-03-21 PROCEDURE — 99213 OFFICE O/P EST LOW 20 MIN: CPT | Mod: PBBFAC,PN | Performed by: INTERNAL MEDICINE

## 2024-03-21 PROCEDURE — 93244 EXT ECG>48HR<7D REV&INTERPJ: CPT | Mod: ,,, | Performed by: INTERNAL MEDICINE

## 2024-03-21 NOTE — PROGRESS NOTES
Subjective:    Patient ID:  Noa Andrade is a 73 y.o. female     Chief Complaint   Patient presents with    Hyperlipidemia    Hypertension       HPI:  Ms Noa Andrade is a 73 y.o. female for follow-up.    Patient has been doing well no specific complaints at the present time.  Her breathing is good denies any shortness a breath or difficulty in breathing she does get dyspnea on exertion after walking a mi or so.  Denies any chest pain or tightness or heaviness.  Denies any exertional angina denies any dizziness or lightheadedness or loss of consciousness.  She denies any palpitations.    She is taking all her medications regularly.        Review of patient's allergies indicates:   Allergen Reactions    Clopidogrel Itching     Other reaction(s): Itch  Other reaction(s): Itching  Other reaction(s): Itch  Other reaction(s): Itching       Past Medical History:   Diagnosis Date    Hyperlipidemia     Hypertension      History reviewed. No pertinent surgical history.  Social History     Tobacco Use    Smoking status: Never    Smokeless tobacco: Never   Substance Use Topics    Alcohol use: Not Currently    Drug use: Not Currently     Family History   Problem Relation Age of Onset    Ulcers Mother     Prostate cancer Father         Review of Systems:   Constitution: Negative for diaphoresis and fever.   HEENT: Negative for nosebleeds.    Cardiovascular: Negative for chest pain       Intermittent dyspnea on exertion       No leg swelling        No palpitations  Respiratory: Negative for shortness of breath and wheezing.    Hematologic/Lymphatic: Negative for bleeding problem. Does not bruise/bleed easily.   Skin: Negative for color change and rash.   Musculoskeletal: Negative for falls and myalgias.   Gastrointestinal: Negative for hematemesis and hematochezia.   Genitourinary: Negative for hematuria.   Neurological: Negative for dizziness and light-headedness.   Psychiatric/Behavioral: Negative for altered mental status and  memory loss.          Objective:        Vitals:    03/21/24 1315   BP: 122/70   Pulse: 78   Resp: 16       Lab Results   Component Value Date     01/22/2024    K 4.1 01/22/2024     01/22/2024    CREATININE 0.9 01/22/2024    BUN 19 01/22/2024    CO2 26 01/22/2024        ECHOCARDIOGRAM RESULTS  Results for orders placed during the hospital encounter of 06/19/23    Echo    Interpretation Summary  · The left ventricle is normal in size with mild concentric hypertrophy and normal systolic function.  · The estimated ejection fraction is 60%.  · Normal left ventricular diastolic function.  · Normal right ventricular size with normal right ventricular systolic function.  · Mild mitral regurgitation.        CURRENT/PREVIOUS VISIT EKG  Results for orders placed or performed in visit on 11/08/23   IN OFFICE EKG 12-LEAD (to [facility])    Collection Time: 11/08/23  2:01 PM    Narrative    Test Reason : I48.0,    Vent. Rate : 079 BPM     Atrial Rate : 079 BPM     P-R Int : 140 ms          QRS Dur : 086 ms      QT Int : 384 ms       P-R-T Axes : 073 082 023 degrees     QTc Int : 440 ms    Normal sinus rhythm  Nonspecific ST abnormality  Abnormal ECG  When compared with ECG of 03-MAY-2023 16:10,  No significant change was found  Confirmed by Montserrat TRAN, Johan MOYA ([id number]) on 12/11/2023 11:09:01 PM    Referred By:             Confirmed By:Johan Mariano MD     No valid procedures specified.   Results for orders placed during the hospital encounter of 06/19/23    Nuclear Stress - Cardiology Interpreted    Interpretation Summary    Normal myocardial perfusion scan. There is no evidence of myocardial ischemia or infarction.    There is a trivial to mild intensity perfusion abnormality in the anteroapical wall of the left ventricle, secondary to breast attenuation.    The gated perfusion images showed an ejection fraction of 60% at rest. The gated perfusion images showed an ejection fraction of 56% post stress. Normal ejection  fraction is greater than 53%.    There is normal wall motion at rest and post stress.    LV cavity size is normal at rest and normal at stress.    The ECG portion of the study is negative for ischemia.    The patient reported chest pain during the stress test.    There were no arrhythmias during stress.      Physical Exam:  CONSTITUTIONAL: No fever, no chills  HEENT: Normocephalic, atraumatic,pupils reactive to light                 NECK:  No JVD no carotid bruit  CVS: S1S2+, RRR, systolic murmurs,   LUNGS: Clear  ABDOMEN: Soft, NT, BS+  EXTREMITIES: No cyanosis, edema  : No jj catheter  NEURO: AAO X 3  PSY: Normal affect      Medication List with Changes/Refills   Current Medications    AMLODIPINE (NORVASC) 2.5 MG TABLET    TAKE 1 TABLET BY MOUTH EVERY DAY    ASPIRIN (ECOTRIN) 81 MG EC TABLET    Take 81 mg by mouth once daily.    ATORVASTATIN (LIPITOR) 40 MG TABLET    Take 20 mg by mouth.    CLOBETASOL (TEMOVATE) 0.05 % CREAM    PLEASE SEE ATTACHED FOR DETAILED DIRECTIONS    FLUTICASONE PROPIONATE (FLONASE) 50 MCG/ACTUATION NASAL SPRAY    1 spray by Each Nostril route 2 (two) times daily.    GABAPENTIN (NEURONTIN) 300 MG CAPSULE    Take by mouth.    MAGNESIUM OXIDE (MAG-OX) 400 MG (241.3 MG MAGNESIUM) TABLET    Take 0.5 tablets (200 mg total) by mouth every other day.    MELOXICAM (MOBIC) 7.5 MG TABLET    7.5 mg.    METOPROLOL SUCCINATE (TOPROL-XL) 25 MG 24 HR TABLET    Take 1 tablet (25 mg total) by mouth 2 (two) times daily.    NAPROXEN (NAPROSYN) 375 MG TABLET    Take 375 mg by mouth 2 (two) times daily with meals.    VALACYCLOVIR (VALTREX) 1000 MG TABLET    Take 1,000 mg by mouth every 12 (twelve) hours.             Assessment:       1. Palpitations    2. Ventricular tachycardia    3. Essential hypertension    4. Severe obesity    5. Paroxysmal SVT (supraventricular tachycardia)    6. CAD in native artery    7. Mixed hyperlipidemia         Plan:   1. Palpitations/ventricular tachycardia  Patient is  currently on metoprolol succinate 25 mg p.o. b.i.d. continue the same.  It has been working very well for her.    She has not had any further palpitations.  She is also on magnesium oxide half a tablet p.o. Q every other day.  2. Supraventricular tachycardia   Patient has had SVT on the monitor no evidence of any atrial fibrillation.    3. Essential hypertension  Patient's blood pressure is stable at 120 2/70 and she is currently on metoprolol succinate 25 mg p.o. b.i.d. amlodipine 2.5 mg p.o. daily continue the same.  4. Coronary artery disease   At the present time she is stable.  She is on aspirin 81 mg p.o. daily and metoprolol succinate 25 mg p.o. b.i.d..  5. Mixed hyperlipidemia   She is on atorvastatin 20 mg p.o. daily continue the same and she follows up with the primary physician is checking her cholesterol and liver function tests.  6. EKG   Reviewed EKG independently patient is in normal sinus rhythm with a heart rate of 78 beats per minute normal intervals and poor R-wave progression in the anterior precordial leads no acute ST elevations.  Borderline nonspecific ST-T changes.  7. Patient to continue current management and I will see her back in the office in 5 months time.              Problem List Items Addressed This Visit          Cardiac/Vascular    Palpitations - Primary    Relevant Orders    IN OFFICE EKG 12-LEAD (to [facility])    Essential hypertension    Mixed hyperlipidemia       Endocrine    Severe obesity     Other Visit Diagnoses       Ventricular tachycardia        Paroxysmal SVT (supraventricular tachycardia)        CAD in native artery                No follow-ups on file.

## 2024-03-27 ENCOUNTER — TELEPHONE (OUTPATIENT)
Dept: CARDIOLOGY | Facility: CLINIC | Age: 74
End: 2024-03-27
Payer: MEDICARE

## 2024-04-04 LAB
OHS CV EVENT MONITOR DAY: 7
OHS CV HOLTER HOOKUP DATE: NORMAL
OHS CV HOLTER HOOKUP TIME: NORMAL
OHS CV HOLTER LENGTH DECIMAL HOURS: 175
OHS CV HOLTER LENGTH HOURS: 7
OHS CV HOLTER LENGTH MINUTES: 0
OHS CV HOLTER SCAN DATE: NORMAL
OHS CV HOLTER SINUS AVERAGE HR: 82 BPM
OHS CV HOLTER SINUS MAX HR: 140 BPM
OHS CV HOLTER SINUS MIN HR: 56 BPM
OHS CV HOLTER STUDY END DATE: NORMAL
OHS CV HOLTER STUDY END TIME: NORMAL

## 2024-05-02 LAB
OHS QRS DURATION: 84 MS
OHS QTC CALCULATION: 435 MS

## 2024-05-08 ENCOUNTER — TELEPHONE (OUTPATIENT)
Dept: CARDIOLOGY | Facility: CLINIC | Age: 74
End: 2024-05-08
Payer: MEDICARE

## 2024-05-08 NOTE — TELEPHONE ENCOUNTER
----- Message from Tariq Garcia sent at 5/8/2024  2:10 PM CDT -----  Type: Needs Medical Advice  Who Called:  pt  Symptoms (please be specific):  pt said she need to speak to the nurse--said she wore a monitor and mailed it back--said she have not heard anything back yet--said she have a colonoscopy coming up and she need to know if it's safe for her to do it--please call and advise  Best Call Back Number: [phone] (home)     Additional Information: thank you

## 2024-05-09 NOTE — TELEPHONE ENCOUNTER
Asked her to come in Friday to discuss results. She stated she was out of town. We scheduled her to come in Monday at 1 pm. She was asked to bring an overnight bag because Dr Colon May send her to the hospital to start new medicine

## 2024-05-13 ENCOUNTER — OFFICE VISIT (OUTPATIENT)
Dept: CARDIOLOGY | Facility: CLINIC | Age: 74
End: 2024-05-13
Payer: MEDICARE

## 2024-05-13 VITALS
OXYGEN SATURATION: 98 % | DIASTOLIC BLOOD PRESSURE: 70 MMHG | WEIGHT: 244 LBS | RESPIRATION RATE: 16 BRPM | HEIGHT: 71 IN | BODY MASS INDEX: 34.16 KG/M2 | SYSTOLIC BLOOD PRESSURE: 130 MMHG

## 2024-05-13 DIAGNOSIS — I47.20 VENTRICULAR TACHYCARDIA: Primary | ICD-10-CM

## 2024-05-13 DIAGNOSIS — I25.10 CAD IN NATIVE ARTERY: ICD-10-CM

## 2024-05-13 DIAGNOSIS — I47.10 PAROXYSMAL SVT (SUPRAVENTRICULAR TACHYCARDIA): ICD-10-CM

## 2024-05-13 DIAGNOSIS — R94.39 ABNORMAL STRESS TEST: ICD-10-CM

## 2024-05-13 DIAGNOSIS — E78.2 MIXED HYPERLIPIDEMIA: ICD-10-CM

## 2024-05-13 DIAGNOSIS — I10 ESSENTIAL HYPERTENSION: ICD-10-CM

## 2024-05-13 PROCEDURE — 99999 PR PBB SHADOW E&M-EST. PATIENT-LVL III: CPT | Mod: PBBFAC,,, | Performed by: INTERNAL MEDICINE

## 2024-05-13 PROCEDURE — 99214 OFFICE O/P EST MOD 30 MIN: CPT | Mod: S$PBB,,, | Performed by: INTERNAL MEDICINE

## 2024-05-13 PROCEDURE — 99213 OFFICE O/P EST LOW 20 MIN: CPT | Mod: PBBFAC,PN | Performed by: INTERNAL MEDICINE

## 2024-05-13 NOTE — PROGRESS NOTES
Subjective:    Patient ID:  Noa Andrade is a 73 y.o. female     Chief Complaint   Patient presents with    Results       HPI:  Ms Noa Andrade is a 73 y.o. female is here for follow-up.    Patient recently had a Holter monitor done and she is here for the results.    Patient has been having intermittent palpitations actually she has intermittent episodes of lightheadedness and dizziness it happened suddenly onset and offset and it lasts less than a minute.  And it is by the time she realizes is gone.    Patient denies any chest pain or tightness or heaviness her breathing has been stable denies any loss of consciousness.  She does have palpitations.    She is taking her medications regularly.        Review of patient's allergies indicates:   Allergen Reactions    Clopidogrel Itching     Other reaction(s): Itch  Other reaction(s): Itching  Other reaction(s): Itch  Other reaction(s): Itching       Past Medical History:   Diagnosis Date    Hyperlipidemia     Hypertension      History reviewed. No pertinent surgical history.  Social History     Tobacco Use    Smoking status: Never    Smokeless tobacco: Never   Substance Use Topics    Alcohol use: Not Currently    Drug use: Not Currently     Family History   Problem Relation Name Age of Onset    Ulcers Mother      Prostate cancer Father          Review of Systems:   Constitution: Negative for diaphoresis and fever.   HEENT: Negative for nosebleeds.    Cardiovascular: Negative for chest pain       No dyspnea on exertion       No leg swelling        No palpitations  Respiratory: Negative for shortness of breath and wheezing.    Hematologic/Lymphatic: Negative for bleeding problem. Does not bruise/bleed easily.   Skin: Negative for color change and rash.   Musculoskeletal: Negative for falls and myalgias.   Gastrointestinal: Negative for hematemesis and hematochezia.   Genitourinary: Negative for hematuria.   Neurological: Negative for dizziness and light-headedness.    Psychiatric/Behavioral: Negative for altered mental status and memory loss.          Objective:        Vitals:    05/13/24 1307   BP: 130/70   Pulse: (P) 79   Resp: 16       Lab Results   Component Value Date     01/22/2024    K 4.1 01/22/2024     01/22/2024    CREATININE 0.9 01/22/2024    BUN 19 01/22/2024    CO2 26 01/22/2024        ECHOCARDIOGRAM RESULTS  Results for orders placed during the hospital encounter of 06/19/23    Echo    Interpretation Summary  · The left ventricle is normal in size with mild concentric hypertrophy and normal systolic function.  · The estimated ejection fraction is 60%.  · Normal left ventricular diastolic function.  · Normal right ventricular size with normal right ventricular systolic function.  · Mild mitral regurgitation.        CURRENT/PREVIOUS VISIT EKG  Results for orders placed or performed in visit on 03/21/24   IN OFFICE EKG 12-LEAD (to [place])    Collection Time: 03/21/24  1:10 PM   Result Value Ref Range    QRS Duration 84 ms    [facility] QTC Calculation 435 ms    Narrative    Test Reason : R00.2,    Vent. Rate : 078 BPM     Atrial Rate : 078 BPM     P-R Int : 142 ms          QRS Dur : 084 ms      QT Int : 382 ms       P-R-T Axes : 064 074 005 degrees     QTc Int : 435 ms    Normal sinus rhythm  Septal infarct ,age undetermined  Abnormal ECG  When compared with ECG of 08-NOV-2023 14:01,  No significant change was found  Confirmed by Isaiah Renee MD ([id number]) on 5/2/2024 10:03:51 AM    Referred By:             Confirmed By:Isaiah Renee MD     No valid procedures specified.   Results for orders placed during the hospital encounter of 06/19/23    Nuclear Stress - Cardiology Interpreted    Interpretation Summary    Normal myocardial perfusion scan. There is no evidence of myocardial ischemia or infarction.    There is a trivial to mild intensity perfusion abnormality in the anteroapical wall of the left ventricle, secondary to breast attenuation.    The gated perfusion  images showed an ejection fraction of 60% at rest. The gated perfusion images showed an ejection fraction of 56% post stress. Normal ejection fraction is greater than 53%.    There is normal wall motion at rest and post stress.    LV cavity size is normal at rest and normal at stress.    The ECG portion of the study is negative for ischemia.    The patient reported chest pain during the stress test.    There were no arrhythmias during stress.      Physical Exam:  CONSTITUTIONAL: No fever, no chills  HEENT: Normocephalic, atraumatic,pupils reactive to light                 NECK:  No JVD no carotid bruit  CVS: S1S2+, RRR, systolic murmurs,   LUNGS: Clear  ABDOMEN: Soft, NT, BS+  EXTREMITIES: No cyanosis, edema  : No jj catheter  NEURO: AAO X 3  PSY: Normal affect      Medication List with Changes/Refills   Current Medications    AMLODIPINE (NORVASC) 2.5 MG TABLET    TAKE 1 TABLET BY MOUTH EVERY DAY    ASPIRIN (ECOTRIN) 81 MG EC TABLET    Take 81 mg by mouth once daily.    ATORVASTATIN (LIPITOR) 40 MG TABLET    Take 20 mg by mouth.    CLOBETASOL (TEMOVATE) 0.05 % CREAM    PLEASE SEE ATTACHED FOR DETAILED DIRECTIONS    FLUTICASONE PROPIONATE (FLONASE) 50 MCG/ACTUATION NASAL SPRAY    1 spray by Each Nostril route 2 (two) times daily.    GABAPENTIN (NEURONTIN) 300 MG CAPSULE    Take by mouth.    MAGNESIUM OXIDE (MAG-OX) 400 MG (241.3 MG MAGNESIUM) TABLET    Take 0.5 tablets (200 mg total) by mouth every other day.    MELOXICAM (MOBIC) 7.5 MG TABLET    7.5 mg.    METOPROLOL SUCCINATE (TOPROL-XL) 25 MG 24 HR TABLET    Take 1 tablet (25 mg total) by mouth 2 (two) times daily.    NAPROXEN (NAPROSYN) 375 MG TABLET    Take 375 mg by mouth 2 (two) times daily with meals.    VALACYCLOVIR (VALTREX) 1000 MG TABLET    Take 1,000 mg by mouth every 12 (twelve) hours.     Interpretation Summary  Show Result Comparison     Predominant underlying rhythm was Sinus Rhythm.    Patient had a min HR of 56 bpm, max HR of 140 bpm, and avg  HR of 82 bpm.    1 run of Ventricular Tachycardia occurred lasting 11 beats with a max rate of 140 bpm (avg 116 bpm).    Isolated VEs were rare (<1.0%, 586), VE Couplets were rare (<1.0%, 5), and VE Triplets were rare (<1.0%, 1).    1 run of Supraventricular Tachycardia occurred lasting 6 beats with a max rate of 140 bpm (avg 125 bpm).    Isolated SVEs were rare (<1.0%, 622), SVE Couplets were rare (<1.0%, 36), and SVE Triplets were rare (<1.0%, 11).    Idioventricular Rhythm was present.    The patient complained of 1 episodes. The symptom(s) included dizziness. The corresponding rhythm to the patient reported event was normal sinus rhythm with a normal RI interval at rate of 92 bpm.    There were 0 auto-triggered events.        Assessment:       1. Ventricular tachycardia    2. Paroxysmal SVT (supraventricular tachycardia)    3. Essential hypertension    4. Mixed hyperlipidemia    5. CAD in native artery    6. Abnormal stress test         Plan:   1. Ventricular tachycardia   Patient had a run of 11 beat ventricular tachycardia and she has been having intermittent episodes of lightheadedness.  Needs further evaluation.    Discussed with patient in regards with starting her on medication either with the amiodarone which has side effects of liver dysfunction thyroid dysfunction and pulmonary fibrosis and/or killer floaters.  Also discussed in regards with sotalol which has side effects of proarrhythmia.  2. Because patient has been having episodes of ventricular tachycardia and SVT intermittently.  Would consult with electrophysiologist in regards with starting the medications.  3. She is currently on metoprolol succinate 25 mg p.o. b.i.d. in spite of that she seems to be breaking through.  Consideration to increase the dosage.  4. Essential hypertension   Her blood pressure is stable at 1 30/70 and she is currently on metoprolol succinate 25 mg p.o. b.i.d. and amlodipine 2.5 mg p.o. daily.  5. Mixed hyperlipidemia    She is currently on atorvastatin 40 mg half a tablet p.o. daily continue the same.  And she follows up with the primary physician is checking her liver function tests.  6. Coronary artery disease   Patient had cardiac catheterization many years ago she had nonobstructive CAD.  7. I will see her back in the office after she has seen the electrophysiologist.              Problem List Items Addressed This Visit          Cardiac/Vascular    Abnormal stress test    Essential hypertension    Mixed hyperlipidemia     Other Visit Diagnoses       Ventricular tachycardia    -  Primary    Paroxysmal SVT (supraventricular tachycardia)        CAD in native artery                No follow-ups on file.

## 2024-05-27 NOTE — PROGRESS NOTES
"Subjective:     HPI    I had the pleasure of seeing Noa Andrade in consultation at your request for the evaluation of NSVT. Last seen in 1/2024. She is a 73F with HTN, HLD, R hip replacement in 7/2023, who began to have episodes of chest pressure with lightheadedness and weakness starting in 10/2023. No syncope. A 14 day ZioXT monitor done in 11/2023 showed sinus rhythm average HR 81 bpm (range  bpm), PAC and PVC burden <1%, 3 runs of NSVT (longest 11.9 seconds), 8 runs of nonsustained AT (longest 17 beats), no sustained arrhythmias, 3 diary entries correlating with NSR and NSR with PVCs.    Coreg switched to toprol xl 25 mg bid in 12/2023. Pt still had episodes of chest pressure, generally at night. Gets up to walk and feels lightheaded.     Echo in 6/2023 showed EF 60%. Regadenoson SPECT in 6/2023 showed no ischemia.    My impression at 1/2024 visit: "Based on her history it does not sound like these (idiopathic VT episodes) are symptomatic (she describes dull chest discomfort and lightheadedness when changing positions as opposed to palpitations). Plan is to continue toprol xl 25 mg bid, 3 day Holter in 3 months, follow-up 6 months."    A 7 day Zio in 3/2024 showed sinus rhythm average HR 82 bpm (range  bpm), PAC and PVC burden both <1%, single episode of NSVT lasting 11 beats, no sustained arrhythmias.    Today in the office Ms. Andrade feels pretty well, occasional lightheadedness which occur when changing positions.    Review of Systems   Constitutional: Negative for decreased appetite, malaise/fatigue, weight gain and weight loss.   HENT:  Negative for sore throat.    Eyes:  Negative for blurred vision.   Cardiovascular:  Positive for irregular heartbeat and palpitations. Negative for chest pain, dyspnea on exertion, leg swelling, near-syncope, orthopnea, paroxysmal nocturnal dyspnea and syncope.   Respiratory:  Negative for shortness of breath.    Skin:  Negative for rash.   Musculoskeletal:  " Negative for arthritis.   Gastrointestinal:  Negative for abdominal pain.   Neurological:  Positive for light-headedness. Negative for focal weakness.   Psychiatric/Behavioral:  Negative for altered mental status.        Objective:   Physical Exam  Vitals and nursing note reviewed.   Constitutional:       General: She is not in acute distress.     Appearance: She is well-developed.   HENT:      Head: Normocephalic and atraumatic.   Eyes:      General: No scleral icterus.     Conjunctiva/sclera: Conjunctivae normal.      Pupils: Pupils are equal, round, and reactive to light.   Neck:      Thyroid: No thyromegaly.      Vascular: No JVD.   Cardiovascular:      Rate and Rhythm: Normal rate and regular rhythm.      Pulses: Intact distal pulses.      Heart sounds: Normal heart sounds. No murmur heard.     No friction rub. No gallop.   Pulmonary:      Effort: Pulmonary effort is normal. No respiratory distress.      Breath sounds: Normal breath sounds.   Abdominal:      General: Bowel sounds are normal. There is no distension.      Palpations: Abdomen is soft.   Musculoskeletal:      Cervical back: Normal range of motion and neck supple.   Skin:     General: Skin is warm and dry.   Neurological:      Mental Status: She is alert and oriented to person, place, and time.   Psychiatric:         Behavior: Behavior normal.         Assessment:      1. PVC (premature ventricular contraction)    2. Essential hypertension        Plan:     In summary, Noa Andrade is a 73F with a history of idiopathic VT. Reassurance provided. Based on her history it does not sound like these episodes are symptomatic (she describes dull chest discomfort and lightheadedness when changing positions as opposed to palpitations). Toprol seems to be effectively suppressing her NSVT (only single 7 beat run over 7 day Zio in 3/2024).    Plan is continue toprol, follow-up PRN.    Thank you for allowing me to participate in the care of this patient. Please do  not hesitate to call me with any questions or concerns.

## 2024-05-29 ENCOUNTER — OFFICE VISIT (OUTPATIENT)
Dept: CARDIOLOGY | Facility: CLINIC | Age: 74
End: 2024-05-29
Payer: MEDICARE

## 2024-05-29 VITALS
BODY MASS INDEX: 33.32 KG/M2 | RESPIRATION RATE: 16 BRPM | OXYGEN SATURATION: 97 % | WEIGHT: 238 LBS | DIASTOLIC BLOOD PRESSURE: 82 MMHG | SYSTOLIC BLOOD PRESSURE: 140 MMHG | HEART RATE: 80 BPM | HEIGHT: 71 IN

## 2024-05-29 DIAGNOSIS — I47.10 PAROXYSMAL SVT (SUPRAVENTRICULAR TACHYCARDIA): ICD-10-CM

## 2024-05-29 DIAGNOSIS — I10 ESSENTIAL HYPERTENSION: ICD-10-CM

## 2024-05-29 DIAGNOSIS — I49.3 PVC (PREMATURE VENTRICULAR CONTRACTION): Primary | ICD-10-CM

## 2024-05-29 DIAGNOSIS — I47.20 VENTRICULAR TACHYCARDIA: ICD-10-CM

## 2024-05-29 PROCEDURE — 99213 OFFICE O/P EST LOW 20 MIN: CPT | Mod: PBBFAC,PN | Performed by: INTERNAL MEDICINE

## 2024-05-29 PROCEDURE — 99214 OFFICE O/P EST MOD 30 MIN: CPT | Mod: S$PBB,,, | Performed by: INTERNAL MEDICINE

## 2024-05-29 PROCEDURE — 99999 PR PBB SHADOW E&M-EST. PATIENT-LVL III: CPT | Mod: PBBFAC,,, | Performed by: INTERNAL MEDICINE

## 2024-06-06 ENCOUNTER — TELEPHONE (OUTPATIENT)
Dept: CARDIOLOGY | Facility: CLINIC | Age: 74
End: 2024-06-06
Payer: MEDICARE

## 2024-06-06 NOTE — TELEPHONE ENCOUNTER
----- Message from Lucero Benedict sent at 6/6/2024  2:08 PM CDT -----  Contact: self  Type: Needs Medical Advice  Who Called:  patient  Best Call Back Number: [phone]  Additional Information: pt saw dr buckley on 5/29 and with his report is it now ok for her to have her colonoscopy.  Call pt back to advise and thanks

## 2024-06-06 NOTE — LETTER
2024    Noa Andrade  [address] [facility] [place] [id number]             [facility]  [address]    [facility] [address]  Phone: [phone]  Fax: [phone] Patient: Noa Andrade  : 1950  Referring Doctor:Dr. Felipa Hunter   Current Outpatient Medications   Medication Sig    amLODIPine (NORVASC) 2.5 MG tablet TAKE 1 TABLET BY MOUTH EVERY DAY    aspirin (ECOTRIN) 81 MG EC tablet Take 81 mg by mouth once daily.    atorvastatin (LIPITOR) 40 MG tablet Take 20 mg by mouth.    clobetasoL (TEMOVATE) 0.05 % cream PLEASE SEE ATTACHED FOR DETAILED DIRECTIONS    fluticasone propionate (FLONASE) 50 mcg/actuation nasal spray 1 spray by Each Nostril route 2 (two) times daily.    gabapentin (NEURONTIN) 300 MG capsule Take by mouth.    magnesium oxide (MAG-OX) 400 mg (241.3 mg magnesium) tablet Take 0.5 tablets (200 mg total) by mouth every other day.    meloxicam (MOBIC) 7.5 MG tablet 7.5 mg.    metoprolol succinate (TOPROL-XL) 25 MG 24 hr tablet Take 1 tablet (25 mg total) by mouth 2 (two) times daily.    naproxen (NAPROSYN) 375 MG tablet Take 375 mg by mouth 2 (two) times daily with meals.    valACYclovir (VALTREX) 1000 MG tablet Take 1,000 mg by mouth every 12 (twelve) hours.     No current facility-administered medications for this visit.       This patient has been assessed for risk factors for clearance of surgery with the following stipulations:    ___ No contraindications  __x_ Recommendations for antiplatelet/anticoagulant medications: hold aspirin for 7 days.  _x__ Cleared for surgery with the following contraindications/precautions: moderate risks.   ___ Not cleared for surgery due to the following reasons:      If you have any questions regarding the above, please contact my office at [phone].    Sincerely,      MU HerrmannC  Department of Cardiology   [facility]

## 2024-10-03 ENCOUNTER — OFFICE VISIT (OUTPATIENT)
Dept: PODIATRY | Facility: CLINIC | Age: 74
End: 2024-10-03
Payer: MEDICARE

## 2024-10-03 VITALS — BODY MASS INDEX: 33.85 KG/M2 | HEIGHT: 71 IN | WEIGHT: 241.81 LBS

## 2024-10-03 DIAGNOSIS — L60.9 DISEASE OF NAIL: ICD-10-CM

## 2024-10-03 DIAGNOSIS — I73.9 ASYMPTOMATIC PVD (PERIPHERAL VASCULAR DISEASE): ICD-10-CM

## 2024-10-03 DIAGNOSIS — G60.9 IDIOPATHIC PERIPHERAL NEUROPATHY: Primary | ICD-10-CM

## 2024-10-03 PROCEDURE — 99203 OFFICE O/P NEW LOW 30 MIN: CPT | Mod: 25,S$GLB,, | Performed by: PODIATRIST

## 2024-10-03 PROCEDURE — 11721 DEBRIDE NAIL 6 OR MORE: CPT | Mod: Q9,S$GLB,, | Performed by: PODIATRIST

## 2024-10-03 RX ORDER — OFLOXACIN 3 MG/ML
5 SOLUTION AURICULAR (OTIC) DAILY
COMMUNITY

## 2024-10-03 RX ORDER — LEVALBUTEROL TARTRATE 45 UG/1
2 AEROSOL, METERED ORAL
COMMUNITY
Start: 2024-09-16

## 2024-10-21 NOTE — PROGRESS NOTES
"Subjective:     Patient ID: Noa Andrade is a 74 y.o. female    Chief Complaint: Nail Care and Foot Problem (Burning and itching of both feet)       [place] is a 74 y.o. female who presents to the clinic for evaluation and treatment of high risk feet. [place] has a past medical history of Hyperlipidemia and Hypertension. The patient's chief complaint is long, thick toenails. This patient has documented high risk feet requiring routine maintenance secondary to peripheral neuropathy.    PCP: Irene Levin MD    Date Last Seen by PCP: 09/19/2024    Current shoe gear:  Affected Foot: Casual shoes     Unaffected Foot: Casual shoes    Last encounter in this department: Visit date not found    No results found for: "HGBA1C"    Review of Systems   Constitutional: Negative.  Negative for chills and fever.   Respiratory:  Negative for cough and shortness of breath.    Cardiovascular:  Positive for leg swelling. Negative for chest pain.   Gastrointestinal:  Negative for diarrhea, nausea and vomiting.   Neurological:  Positive for tingling.        Objective:   Physical Exam  Vitals reviewed.   Constitutional:       General: She is not in acute distress.     Appearance: Normal appearance. She is not ill-appearing.   HENT:      Head: Normocephalic.      Nose: Nose normal.   Cardiovascular:      Pulses:           Dorsalis pedis pulses are 1+ on the right side and 1+ on the left side.        Posterior tibial pulses are 1+ on the right side and 1+ on the left side.   Pulmonary:      Effort: Pulmonary effort is normal. No respiratory distress.   Skin:     Capillary Refill: Capillary refill takes 2 to 3 seconds.   Neurological:      Mental Status: She is alert and oriented to person, place, and time.   Psychiatric:         Mood and Affect: Mood normal.         Behavior: Behavior normal.         Thought Content: Thought content normal.         Judgment: Judgment normal.        Foot Exam    General  General Appearance: appears " stated age and healthy   Orientation: alert and oriented to person, place, and time   Affect: appropriate       Right Foot/Ankle     Inspection and Palpation  Swelling: dorsum   Hammertoes: second toe, fifth toe, fourth toe and third toe    Neurovascular  Dorsalis pedis: 1+  Posterior tibial: 1+      Left Foot/Ankle      Inspection and Palpation  Swelling: dorsum   Hammertoes: second toe, fifth toe, third toe and fourth toe    Neurovascular  Dorsalis pedis: 1+  Posterior tibial: 1+      Vascular: Pedal hair growth is absent bilateral lower extremity, positive hyperpigmentation noted to the bilateral lower 3rd of bilateral lower extremity, skin temperature gradient warm to cool from proximal distal bilateral lower extremity   Dermatologic: Nails 1 through 5 bilateral mildly thickened mildly discolored and elongated, soft sensation for skin changes noted   Neurologic: Positive paresthesias reported, positive burning reported    Assessment:         1. Idiopathic peripheral neuropathy    2. Asymptomatic PVD (peripheral vascular disease)    3. Disease of nail       Plan:     Noa Andrade was seen today for   Chief Complaint   Patient presents with    Nail Care    Foot Problem     Burning and itching of both feet       Assessment & Plan           Routine Foot Care    Date/Time: 10/3/2024 8:15 AM    Performed by: Marga Fair DPM  Authorized by: Marga Fair DPM    Consent Done?:  Yes (Verbal)    Nail Care Type:  Debride  Location(s): All  (Left 1st Toe, Left 3rd Toe, Left 2nd Toe, Left 4th Toe, Left 5th Toe, Right 1st Toe, Right 2nd Toe, Right 3rd Toe, Right 4th Toe and Right 5th Toe)  Patient tolerance:  Patient tolerated the procedure well with no immediate complications       1. Patient was examined and evaluated.    2. Patient was advised of the etiology of hammertoe deformity.  Patient was advised to adjust shoe gear for better comfort and fit including increased toe box height and width and selection  shoe gear with soft stretchable uppers.  3. Discussed with patient the etiology of nail fungus and as possible secondary issue to prior nail trauma.  Discussed with patient OTC topical conservative treatments such as Vicks vapor rub, tea tree oil as well as coconut oil.  Discussed with patient risks and benefits oral Lamisil therapy.   4. Discussed with patient burning tingling in the feet.  Patient made aware that it maybe related to peripheral neuropathy.  Patient was advised to consider re-initiation of previously dispensed Neurontin.  Patient was also advised to monitor vitamin B12 levels  5. Patient will follow up in 3 months or p.r.n. for complaints      Tian Fair DPM  [address] [place] [address]  [phone]      This note was created using [employer] direct voice recognition software. Note may have occasional typographical errors that may not have been identified and edited despite initial review prior to signing.

## 2024-11-12 ENCOUNTER — OFFICE VISIT (OUTPATIENT)
Dept: CARDIOLOGY | Facility: CLINIC | Age: 74
End: 2024-11-12
Payer: MEDICARE

## 2024-11-12 VITALS
WEIGHT: 243.19 LBS | HEART RATE: 83 BPM | DIASTOLIC BLOOD PRESSURE: 81 MMHG | BODY MASS INDEX: 34.05 KG/M2 | SYSTOLIC BLOOD PRESSURE: 145 MMHG | HEIGHT: 71 IN | OXYGEN SATURATION: 97 %

## 2024-11-12 DIAGNOSIS — G56.00 CARPAL TUNNEL SYNDROME, UNSPECIFIED LATERALITY: ICD-10-CM

## 2024-11-12 DIAGNOSIS — E66.01 SEVERE OBESITY: ICD-10-CM

## 2024-11-12 DIAGNOSIS — E78.2 MIXED HYPERLIPIDEMIA: ICD-10-CM

## 2024-11-12 DIAGNOSIS — I47.10 PAROXYSMAL SVT (SUPRAVENTRICULAR TACHYCARDIA): Primary | ICD-10-CM

## 2024-11-12 DIAGNOSIS — R06.02 SOB (SHORTNESS OF BREATH): ICD-10-CM

## 2024-11-12 DIAGNOSIS — I25.10 CAD IN NATIVE ARTERY: ICD-10-CM

## 2024-11-12 DIAGNOSIS — I47.20 VENTRICULAR TACHYCARDIA: ICD-10-CM

## 2024-11-12 DIAGNOSIS — I10 ESSENTIAL HYPERTENSION: ICD-10-CM

## 2024-11-12 DIAGNOSIS — R20.2 NUMBNESS AND TINGLING: ICD-10-CM

## 2024-11-12 DIAGNOSIS — R53.83 FATIGUE, UNSPECIFIED TYPE: ICD-10-CM

## 2024-11-12 DIAGNOSIS — R20.0 NUMBNESS AND TINGLING: ICD-10-CM

## 2024-11-12 PROCEDURE — 99213 OFFICE O/P EST LOW 20 MIN: CPT | Mod: PBBFAC,PN | Performed by: INTERNAL MEDICINE

## 2024-11-12 PROCEDURE — 99214 OFFICE O/P EST MOD 30 MIN: CPT | Mod: S$PBB,,, | Performed by: INTERNAL MEDICINE

## 2024-11-12 PROCEDURE — 99999 PR PBB SHADOW E&M-EST. PATIENT-LVL III: CPT | Mod: PBBFAC,,, | Performed by: INTERNAL MEDICINE

## 2024-11-12 NOTE — PROGRESS NOTES
Subjective:    Patient ID:  Noa Andrade is a 74 y.o. female     Chief Complaint   Patient presents with    Follow-up     6 month f/u       HPI:  Ms Noa Andrade is a 74 y.o. female for follow-up.    Patient has been doing well no specific complaints at the present time.  Her breathing has been good denies any shortness a breath or difficulty in breathing.  Denies any chest pain or tightness or heaviness denies any dizziness or lightheadedness she does have intermittent lightheadedness on standing up and sometimes she has it for a minute or 2.  And is spontaneously relieved.    Denies any falls or loss of consciousness.    She does have left hand carpal tunnel syndrome and the tingling and numbness is getting worse and the pain is radiating backwards to her elbow.    Review of patient's allergies indicates:   Allergen Reactions    Clopidogrel Itching     Other reaction(s): Itch  Other reaction(s): Itching  Other reaction(s): Itch  Other reaction(s): Itching       Past Medical History:   Diagnosis Date    Hyperlipidemia     Hypertension      History reviewed. No pertinent surgical history.  Social History     Tobacco Use    Smoking status: Never    Smokeless tobacco: Never   Substance Use Topics    Alcohol use: Not Currently    Drug use: Not Currently     Family History   Problem Relation Name Age of Onset    Ulcers Mother      Prostate cancer Father          Review of Systems:   Constitution: Negative for diaphoresis and fever.   HEENT: Negative for nosebleeds.    Cardiovascular: Negative for chest pain       No dyspnea on exertion       No leg swelling        No palpitations  Respiratory: Negative for shortness of breath and wheezing.    Hematologic/Lymphatic: Negative for bleeding problem. Does not bruise/bleed easily.   Skin: Negative for color change and rash.   Musculoskeletal: Negative for falls and myalgias.   Gastrointestinal: Negative for hematemesis and hematochezia.   Genitourinary: Negative for  hematuria.   Neurological: Negative for dizziness and light-headedness.   Psychiatric/Behavioral: Negative for altered mental status and memory loss.          Objective:        Vitals:    11/12/24 1354   BP: (!) 145/81   Pulse: 83       Lab Results   Component Value Date     01/22/2024    K 4.1 01/22/2024     01/22/2024    CREATININE 0.9 01/22/2024    BUN 19 01/22/2024    CO2 26 01/22/2024        ECHOCARDIOGRAM RESULTS  Results for orders placed during the hospital encounter of 06/19/23    Echo    Interpretation Summary  · The left ventricle is normal in size with mild concentric hypertrophy and normal systolic function.  · The estimated ejection fraction is 60%.  · Normal left ventricular diastolic function.  · Normal right ventricular size with normal right ventricular systolic function.  · Mild mitral regurgitation.        CURRENT/PREVIOUS VISIT EKG  Results for orders placed or performed in visit on 03/21/24   IN OFFICE EKG 12-LEAD (to [facility])    Collection Time: 03/21/24  1:10 PM   Result Value Ref Range    QRS Duration 84 ms    [facility] QTC Calculation 435 ms    Narrative    Test Reason : R00.2,    Vent. Rate : 078 BPM     Atrial Rate : 078 BPM     P-R Int : 142 ms          QRS Dur : 084 ms      QT Int : 382 ms       P-R-T Axes : 064 074 005 degrees     QTc Int : 435 ms    Normal sinus rhythm  Septal infarct ,age undetermined  Abnormal ECG  When compared with ECG of 08-NOV-2023 14:01,  No significant change was found  Confirmed by Isaiah Renee MD ([id number]) on 5/2/2024 10:03:51 AM    Referred By:             Confirmed By:Isaiah Renee MD     No valid procedures specified.   Results for orders placed during the hospital encounter of 06/19/23    Nuclear Stress - Cardiology Interpreted    Interpretation Summary    Normal myocardial perfusion scan. There is no evidence of myocardial ischemia or infarction.    There is a trivial to mild intensity perfusion abnormality in the anteroapical wall of the left  ventricle, secondary to breast attenuation.    The gated perfusion images showed an ejection fraction of 60% at rest. The gated perfusion images showed an ejection fraction of 56% post stress. Normal ejection fraction is greater than 53%.    There is normal wall motion at rest and post stress.    LV cavity size is normal at rest and normal at stress.    The ECG portion of the study is negative for ischemia.    The patient reported chest pain during the stress test.    There were no arrhythmias during stress.      Physical Exam:  CONSTITUTIONAL: No fever, no chills  HEENT: Normocephalic, atraumatic,pupils reactive to light                 NECK:  No JVD no carotid bruit  CVS: S1S2+, RRR, systolic murmurs,   LUNGS: Clear  ABDOMEN: Soft, NT, BS+  EXTREMITIES: No cyanosis, edema  : No jj catheter  NEURO: AAO X 3  PSY: Normal affect      Medication List with Changes/Refills   Current Medications    AMLODIPINE (NORVASC) 2.5 MG TABLET    TAKE 1 TABLET BY MOUTH EVERY DAY    ASPIRIN (ECOTRIN) 81 MG EC TABLET    Take 81 mg by mouth once daily.    ATORVASTATIN (LIPITOR) 20 MG TABLET    Take 20 mg by mouth once daily.    FLUTICASONE PROPIONATE (FLONASE) 50 MCG/ACTUATION NASAL SPRAY    1 spray by Each Nostril route 2 (two) times daily.    MAGNESIUM OXIDE (MAG-OX) 400 MG (241.3 MG MAGNESIUM) TABLET    Take 0.5 tablets (200 mg total) by mouth every other day.    MELOXICAM (MOBIC) 7.5 MG TABLET    7.5 mg.    METOPROLOL SUCCINATE (TOPROL-XL) 25 MG 24 HR TABLET    Take 1 tablet (25 mg total) by mouth 2 (two) times daily.    OFLOXACIN (FLOXIN) 0.3 % OTIC SOLUTION    5 drops once daily.    VALACYCLOVIR (VALTREX) 1000 MG TABLET    Take 1,000 mg by mouth every 12 (twelve) hours.   Discontinued Medications    CLOBETASOL (TEMOVATE) 0.05 % CREAM    PLEASE SEE ATTACHED FOR DETAILED DIRECTIONS    GABAPENTIN (NEURONTIN) 300 MG CAPSULE    Take by mouth.    NAPROXEN (NAPROSYN) 375 MG TABLET    Take 375 mg by mouth 2 (two) times daily with  meals.    XOPENEX HFA 45 MCG/ACTUATION INHALER    2 puffs.             Assessment:       1. Paroxysmal SVT (supraventricular tachycardia)    2. Ventricular tachycardia    3. Essential hypertension    4. Mixed hyperlipidemia    5. CAD in native artery    6. Severe obesity    7. Fatigue, unspecified type    8. SOB (shortness of breath)    9. Numbness and tingling    10. Carpal tunnel syndrome, unspecified laterality         Plan:   1. Paroxysmal supraventricular tachycardia   Patient is stable at the present time.  She is currently on metoprolol succinate 25 mg p.o. b.i.d. seems to be working well for her.    2. Ventricular tachycardia   She has not had any further episodes of ventricular tachycardia and has been stable.  And patient to continue her metoprolol succinate 25 mg p.o. b.i.d..  Patient is also on magnesium oxide half a tablet p.o. daily continue the same.    3. Essential hypertension   Her blood pressure is 145/81 and apart from metoprolol patient is also on amlodipine 2.5 mg p.o. daily continue the same.    4. Mixed hyperlipidemia  She is on atorvastatin 20 mg p.o. daily continue the same and a primary physician is checking a cholesterol and liver function tests.    5. Severe obesity   Patient is stable at the present time she is weighing 243 lb looks like she has lost weight compared to a previous record.  Patient to continue to be active continue low-fat low-cholesterol diet.    6. Carpal tunnel syndrome   Patient does have tingling sensation in her left hand and appears to be gradually getting worse discussed with patient that she can undergo carpal tunnel surgery since it is done under local anesthesia.  7. EKG   Reviewed EKG independently patient is in normal sinus rhythm with a heart rate of 67 beats per minute normal intervals and poor R-wave progression in the anterior precordial leads no acute ST T-wave changes.    8. I will see her back in the office in 4 months time.          Problem List Items  Addressed This Visit       Essential hypertension    Mixed hyperlipidemia    Severe obesity     Other Visit Diagnoses       Paroxysmal SVT (supraventricular tachycardia)    -  Primary    Ventricular tachycardia        CAD in native artery        Fatigue, unspecified type        SOB (shortness of breath)        Numbness and tingling        Carpal tunnel syndrome, unspecified laterality                No follow-ups on file.

## 2024-11-13 LAB
OHS QRS DURATION: 86 MS
OHS QTC CALCULATION: 407 MS

## 2024-12-23 RX ORDER — METOPROLOL SUCCINATE 25 MG/1
25 TABLET, EXTENDED RELEASE ORAL 2 TIMES DAILY
Qty: 60 TABLET | Refills: 11 | Status: SHIPPED | OUTPATIENT
Start: 2024-12-23

## 2025-02-04 ENCOUNTER — TELEPHONE (OUTPATIENT)
Dept: CARDIOLOGY | Facility: CLINIC | Age: 75
End: 2025-02-04
Payer: MEDICARE

## 2025-04-09 ENCOUNTER — OFFICE VISIT (OUTPATIENT)
Dept: PODIATRY | Facility: CLINIC | Age: 75
End: 2025-04-09
Payer: MEDICARE

## 2025-04-09 VITALS — HEIGHT: 71 IN | WEIGHT: 236.63 LBS | BODY MASS INDEX: 33.13 KG/M2

## 2025-04-09 DIAGNOSIS — L60.9 DISEASE OF NAIL: ICD-10-CM

## 2025-04-09 DIAGNOSIS — I73.9 ASYMPTOMATIC PVD (PERIPHERAL VASCULAR DISEASE): Primary | ICD-10-CM

## 2025-04-09 RX ORDER — PANTOPRAZOLE SODIUM 40 MG/1
40 TABLET, DELAYED RELEASE ORAL
COMMUNITY
Start: 2025-03-10

## 2025-04-09 NOTE — PROGRESS NOTES
"Subjective:     Patient ID: Noa Andrade is a 74 y.o. female    Chief Complaint: Nail Care       [place] is a 74 y.o. female who presents to the clinic for evaluation and treatment of high risk feet. [place] has a past medical history of Hyperlipidemia and Hypertension. The patient's chief complaint is long, thick toenails. This patient has documented high risk feet requiring routine maintenance secondary to peripheral vascular disease.    PCP: Irene Levin MD    Date Last Seen by PCP: 02/07/2025    Current shoe gear:  Affected Foot: Tennis shoes     Unaffected Foot: Tennis shoes    Last encounter in this department: Visit date not found    No results found for: "HGBA1C"    Review of Systems   Constitutional: Negative.  Negative for chills and fever.   Respiratory:  Negative for cough and shortness of breath.    Cardiovascular:  Positive for leg swelling. Negative for chest pain.   Gastrointestinal:  Negative for diarrhea, nausea and vomiting.   Neurological:  Positive for tingling.        Objective:   Physical Exam  Vitals reviewed.   Constitutional:       General: She is not in acute distress.     Appearance: Normal appearance. She is not ill-appearing.   HENT:      Head: Normocephalic.      Nose: Nose normal.   Cardiovascular:      Pulses:           Dorsalis pedis pulses are 1+ on the right side and 1+ on the left side.        Posterior tibial pulses are 1+ on the right side and 1+ on the left side.   Pulmonary:      Effort: Pulmonary effort is normal. No respiratory distress.   Skin:     Capillary Refill: Capillary refill takes 2 to 3 seconds.   Neurological:      Mental Status: She is alert and oriented to person, place, and time.   Psychiatric:         Mood and Affect: Mood normal.         Behavior: Behavior normal.         Thought Content: Thought content normal.         Judgment: Judgment normal.        Foot Exam    General  General Appearance: appears stated age and healthy   Orientation: alert and " oriented to person, place, and time   Affect: appropriate       Right Foot/Ankle     Inspection and Palpation  Tenderness: none   Swelling: dorsum   Arch: normal  Skin Exam: skin changes and abnormal color;     Neurovascular  Dorsalis pedis: 1+  Posterior tibial: 1+    Muscle Strength  Ankle dorsiflexion: 5  Ankle plantar flexion: 5  Ankle inversion: 5  Ankle eversion: 5  Great toe extension: 5  Great toe flexion: 5      Left Foot/Ankle      Inspection and Palpation  Tenderness: none   Swelling: dorsum   Arch: normal  Skin Exam: skin changes and abnormal color;     Neurovascular  Dorsalis pedis: 1+  Posterior tibial: 1+    Muscle Strength  Ankle dorsiflexion: 5  Ankle plantar flexion: 5  Ankle inversion: 5  Ankle eversion: 5  Great toe extension: 5  Great toe flexion: 5      Vascular: Pedal hair growth is absent bilateral lower extremity, positive hyperpigmentation noted to the bilateral lower 3rd of bilateral lower extremity, skin temperature gradient warm to cool from proximal distal bilateral lower extremity   Dermatologic: Nails 1 through 5 bilateral mildly thickened mildly discolored and elongated, soft sensation for skin changes noted   Neurologic: Positive paresthesias reported, positive burning reported     Assessment:         1. Asymptomatic PVD (peripheral vascular disease)    2. Disease of nail       Plan:     Noa Andrade was seen today for   Chief Complaint   Patient presents with    Nail Care       Assessment & Plan           Routine Foot Care    Date/Time: 4/9/2025 3:30 PM    Performed by: Marga Fair DPM  Authorized by: Marga Fair DPM    Consent Done?:  Yes (Verbal)  Hyperkeratotic Skin Lesions?: No      Nail Care Type:  Debride  Location(s): All  (Left 1st Toe, Left 3rd Toe, Left 2nd Toe, Left 4th Toe, Left 5th Toe, Right 1st Toe, Right 2nd Toe, Right 3rd Toe, Right 4th Toe and Right 5th Toe)  Patient tolerance:  Patient tolerated the procedure well with no immediate complications        1. Patient was examined and evaluated.    2. Discussed with patient the etiology of nail fungus and as possible secondary issue to prior nail trauma.  Discussed with patient OTC topical conservative treatments such as Vicks vapor rub, tea tree oil as well as coconut oil.  Discussed with patient risks and benefits oral Lamisil therapy.   3. Discussed with patient etiology of asymptomatic peripheral vascular disease.  Patient was advised elevate lower extremity rest consider daily use of compression stockings.  Patient will monitor dietary salt intake and water consumption.    4. Patient will follow-up in 4-5 months or p.r.n. for complaints      Tian Fair DPM  [id number] [address] [place] [address]  [phone]      This note was created using [employer] direct voice recognition software. Note may have occasional typographical errors that may not have been identified and edited despite initial review prior to signing.

## 2025-08-06 ENCOUNTER — OFFICE VISIT (OUTPATIENT)
Dept: PODIATRY | Facility: CLINIC | Age: 75
End: 2025-08-06
Payer: MEDICARE

## 2025-08-06 VITALS — BODY MASS INDEX: 32.79 KG/M2 | WEIGHT: 234.19 LBS | HEIGHT: 71 IN

## 2025-08-06 DIAGNOSIS — I73.9 ASYMPTOMATIC PVD (PERIPHERAL VASCULAR DISEASE): Primary | ICD-10-CM

## 2025-08-06 DIAGNOSIS — L60.9 DISEASE OF NAIL: ICD-10-CM

## 2025-08-06 DIAGNOSIS — L84 CORN OR CALLUS: ICD-10-CM

## 2025-08-06 RX ORDER — NAPROXEN 500 MG/1
TABLET ORAL
COMMUNITY
Start: 2025-07-08

## 2025-08-06 RX ORDER — MUPIROCIN 20 MG/G
1 OINTMENT TOPICAL
COMMUNITY
Start: 2025-07-08

## 2025-08-06 RX ORDER — TRAMADOL HYDROCHLORIDE 50 MG/1
TABLET, FILM COATED ORAL
COMMUNITY
Start: 2025-01-28
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
